# Patient Record
Sex: MALE | Race: WHITE
[De-identification: names, ages, dates, MRNs, and addresses within clinical notes are randomized per-mention and may not be internally consistent; named-entity substitution may affect disease eponyms.]

---

## 2017-03-29 ENCOUNTER — HOSPITAL ENCOUNTER (EMERGENCY)
Dept: HOSPITAL 8 - ED | Age: 57
LOS: 1 days | Discharge: HOME | End: 2017-03-30
Payer: COMMERCIAL

## 2017-03-29 VITALS — HEIGHT: 71 IN | WEIGHT: 253.53 LBS | BODY MASS INDEX: 35.49 KG/M2

## 2017-03-29 DIAGNOSIS — H93.19: ICD-10-CM

## 2017-03-29 DIAGNOSIS — F11.10: ICD-10-CM

## 2017-03-29 DIAGNOSIS — R07.89: ICD-10-CM

## 2017-03-29 DIAGNOSIS — Z79.84: ICD-10-CM

## 2017-03-29 DIAGNOSIS — G89.29: ICD-10-CM

## 2017-03-29 DIAGNOSIS — E11.9: ICD-10-CM

## 2017-03-29 DIAGNOSIS — Z79.4: ICD-10-CM

## 2017-03-29 DIAGNOSIS — R06.02: ICD-10-CM

## 2017-03-29 DIAGNOSIS — I10: ICD-10-CM

## 2017-03-29 DIAGNOSIS — H53.149: ICD-10-CM

## 2017-03-29 DIAGNOSIS — R51: Primary | ICD-10-CM

## 2017-03-29 PROCEDURE — 84484 ASSAY OF TROPONIN QUANT: CPT

## 2017-03-29 PROCEDURE — 93005 ELECTROCARDIOGRAM TRACING: CPT

## 2017-03-29 PROCEDURE — 71010: CPT

## 2017-03-29 PROCEDURE — 70450 CT HEAD/BRAIN W/O DYE: CPT

## 2017-03-29 PROCEDURE — 85025 COMPLETE CBC W/AUTO DIFF WBC: CPT

## 2017-03-29 PROCEDURE — 80053 COMPREHEN METABOLIC PANEL: CPT

## 2017-03-29 PROCEDURE — 36415 COLL VENOUS BLD VENIPUNCTURE: CPT

## 2017-03-30 VITALS — SYSTOLIC BLOOD PRESSURE: 127 MMHG | DIASTOLIC BLOOD PRESSURE: 81 MMHG

## 2017-03-30 LAB
AST SERPL-CCNC: 39 U/L (ref 15–37)
BUN SERPL-MCNC: 9 MG/DL (ref 7–18)
HGB BLD-MCNC: 14 G/DL (ref 13.7–18)
IS PT STATUS REG ER OR PRE ER?: YES

## 2017-08-21 ENCOUNTER — APPOINTMENT (OUTPATIENT)
Dept: RADIOLOGY | Facility: MEDICAL CENTER | Age: 57
End: 2017-08-21
Attending: EMERGENCY MEDICINE
Payer: MEDICARE

## 2017-08-21 ENCOUNTER — HOSPITAL ENCOUNTER (EMERGENCY)
Facility: MEDICAL CENTER | Age: 57
End: 2017-08-21
Attending: EMERGENCY MEDICINE
Payer: MEDICARE

## 2017-08-21 VITALS
SYSTOLIC BLOOD PRESSURE: 187 MMHG | HEART RATE: 58 BPM | WEIGHT: 257 LBS | RESPIRATION RATE: 16 BRPM | HEIGHT: 71 IN | TEMPERATURE: 98.8 F | BODY MASS INDEX: 35.98 KG/M2 | DIASTOLIC BLOOD PRESSURE: 154 MMHG | OXYGEN SATURATION: 91 %

## 2017-08-21 DIAGNOSIS — R07.89 XYPHOIDALGIA: ICD-10-CM

## 2017-08-21 DIAGNOSIS — N50.811 TESTICULAR PAIN, RIGHT: ICD-10-CM

## 2017-08-21 LAB
ALBUMIN SERPL BCP-MCNC: 4 G/DL (ref 3.2–4.9)
ALBUMIN/GLOB SERPL: 1.3 G/DL
ALP SERPL-CCNC: 77 U/L (ref 30–99)
ALT SERPL-CCNC: 57 U/L (ref 2–50)
ANION GAP SERPL CALC-SCNC: 9 MMOL/L (ref 0–11.9)
APPEARANCE UR: CLEAR
AST SERPL-CCNC: 38 U/L (ref 12–45)
BASOPHILS # BLD AUTO: 1.2 % (ref 0–1.8)
BASOPHILS # BLD: 0.09 K/UL (ref 0–0.12)
BILIRUB SERPL-MCNC: 0.6 MG/DL (ref 0.1–1.5)
BILIRUB UR QL STRIP.AUTO: NEGATIVE
BUN SERPL-MCNC: 10 MG/DL (ref 8–22)
CALCIUM SERPL-MCNC: 9.3 MG/DL (ref 8.5–10.5)
CHLORIDE SERPL-SCNC: 102 MMOL/L (ref 96–112)
CO2 SERPL-SCNC: 25 MMOL/L (ref 20–33)
COLOR UR: YELLOW
CREAT SERPL-MCNC: 0.79 MG/DL (ref 0.5–1.4)
CULTURE IF INDICATED INDCX: NO UA CULTURE
EOSINOPHIL # BLD AUTO: 0.35 K/UL (ref 0–0.51)
EOSINOPHIL NFR BLD: 4.5 % (ref 0–6.9)
ERYTHROCYTE [DISTWIDTH] IN BLOOD BY AUTOMATED COUNT: 38.8 FL (ref 35.9–50)
GFR SERPL CREATININE-BSD FRML MDRD: >60 ML/MIN/1.73 M 2
GLOBULIN SER CALC-MCNC: 3.2 G/DL (ref 1.9–3.5)
GLUCOSE SERPL-MCNC: 196 MG/DL (ref 65–99)
GLUCOSE UR STRIP.AUTO-MCNC: >=1000 MG/DL
HCT VFR BLD AUTO: 43.5 % (ref 42–52)
HGB BLD-MCNC: 15.3 G/DL (ref 14–18)
IMM GRANULOCYTES # BLD AUTO: 0.03 K/UL (ref 0–0.11)
IMM GRANULOCYTES NFR BLD AUTO: 0.4 % (ref 0–0.9)
KETONES UR STRIP.AUTO-MCNC: NEGATIVE MG/DL
LEUKOCYTE ESTERASE UR QL STRIP.AUTO: NEGATIVE
LIPASE SERPL-CCNC: 26 U/L (ref 11–82)
LYMPHOCYTES # BLD AUTO: 3.6 K/UL (ref 1–4.8)
LYMPHOCYTES NFR BLD: 46.5 % (ref 22–41)
MCH RBC QN AUTO: 29.8 PG (ref 27–33)
MCHC RBC AUTO-ENTMCNC: 35.2 G/DL (ref 33.7–35.3)
MCV RBC AUTO: 84.6 FL (ref 81.4–97.8)
MICRO URNS: ABNORMAL
MONOCYTES # BLD AUTO: 0.38 K/UL (ref 0–0.85)
MONOCYTES NFR BLD AUTO: 4.9 % (ref 0–13.4)
NEUTROPHILS # BLD AUTO: 3.3 K/UL (ref 1.82–7.42)
NEUTROPHILS NFR BLD: 42.5 % (ref 44–72)
NITRITE UR QL STRIP.AUTO: NEGATIVE
NRBC # BLD AUTO: 0 K/UL
NRBC BLD AUTO-RTO: 0 /100 WBC
PH UR STRIP.AUTO: 5 [PH]
PLATELET # BLD AUTO: 144 K/UL (ref 164–446)
PMV BLD AUTO: 11.4 FL (ref 9–12.9)
POTASSIUM SERPL-SCNC: 3.8 MMOL/L (ref 3.6–5.5)
PROT SERPL-MCNC: 7.2 G/DL (ref 6–8.2)
PROT UR QL STRIP: NEGATIVE MG/DL
RBC # BLD AUTO: 5.14 M/UL (ref 4.7–6.1)
RBC UR QL AUTO: NEGATIVE
SODIUM SERPL-SCNC: 136 MMOL/L (ref 135–145)
SP GR UR REFRACTOMETRY: >1.035
SP GR UR STRIP.AUTO: >=1.045
UROBILINOGEN UR STRIP.AUTO-MCNC: 0.2 MG/DL
WBC # BLD AUTO: 7.8 K/UL (ref 4.8–10.8)

## 2017-08-21 PROCEDURE — 96374 THER/PROPH/DIAG INJ IV PUSH: CPT | Mod: XU

## 2017-08-21 PROCEDURE — 76870 US EXAM SCROTUM: CPT

## 2017-08-21 PROCEDURE — 74177 CT ABD & PELVIS W/CONTRAST: CPT

## 2017-08-21 PROCEDURE — 700111 HCHG RX REV CODE 636 W/ 250 OVERRIDE (IP): Performed by: EMERGENCY MEDICINE

## 2017-08-21 PROCEDURE — 80053 COMPREHEN METABOLIC PANEL: CPT

## 2017-08-21 PROCEDURE — 93005 ELECTROCARDIOGRAM TRACING: CPT | Performed by: EMERGENCY MEDICINE

## 2017-08-21 PROCEDURE — 83690 ASSAY OF LIPASE: CPT

## 2017-08-21 PROCEDURE — 700117 HCHG RX CONTRAST REV CODE 255: Performed by: EMERGENCY MEDICINE

## 2017-08-21 PROCEDURE — 99284 EMERGENCY DEPT VISIT MOD MDM: CPT

## 2017-08-21 PROCEDURE — 85025 COMPLETE CBC W/AUTO DIFF WBC: CPT

## 2017-08-21 PROCEDURE — 81003 URINALYSIS AUTO W/O SCOPE: CPT

## 2017-08-21 RX ORDER — LEVOTHYROXINE SODIUM 0.05 MG/1
75 TABLET ORAL
COMMUNITY
End: 2021-04-06

## 2017-08-21 RX ORDER — METHADONE HYDROCHLORIDE 10 MG/1
150 TABLET ORAL
COMMUNITY
End: 2021-04-06

## 2017-08-21 RX ORDER — ONDANSETRON 2 MG/ML
4 INJECTION INTRAMUSCULAR; INTRAVENOUS ONCE
Status: COMPLETED | OUTPATIENT
Start: 2017-08-21 | End: 2017-08-21

## 2017-08-21 RX ADMIN — ONDANSETRON 4 MG: 2 INJECTION INTRAMUSCULAR; INTRAVENOUS at 14:05

## 2017-08-21 RX ADMIN — IOHEXOL 100 ML: 350 INJECTION, SOLUTION INTRAVENOUS at 14:19

## 2017-08-21 ASSESSMENT — LIFESTYLE VARIABLES: DO YOU DRINK ALCOHOL: NO

## 2017-08-21 NOTE — DISCHARGE INSTRUCTIONS
Chest Wall Pain  Chest wall pain is pain in or around the bones and muscles of your chest. It may take up to 6 weeks to get better. It may take longer if you must stay physically active in your work and activities.   CAUSES   Chest wall pain may happen on its own. However, it may be caused by:  · A viral illness like the flu.  · Injury.  · Coughing.  · Exercise.  · Arthritis.  · Fibromyalgia.  · Shingles.  HOME CARE INSTRUCTIONS   · Avoid overtiring physical activity. Try not to strain or perform activities that cause pain. This includes any activities using your chest or your abdominal and side muscles, especially if heavy weights are used.  · Put ice on the sore area.  ¨ Put ice in a plastic bag.  ¨ Place a towel between your skin and the bag.  ¨ Leave the ice on for 15-20 minutes per hour while awake for the first 2 days.  · Only take over-the-counter or prescription medicines for pain, discomfort, or fever as directed by your caregiver.  SEEK IMMEDIATE MEDICAL CARE IF:   · Your pain increases, or you are very uncomfortable.  · You have a fever.  · Your chest pain becomes worse.  · You have new, unexplained symptoms.  · You have nausea or vomiting.  · You feel sweaty or lightheaded.  · You have a cough with phlegm (sputum), or you cough up blood.  MAKE SURE YOU:   · Understand these instructions.  · Will watch your condition.  · Will get help right away if you are not doing well or get worse.     This information is not intended to replace advice given to you by your health care provider. Make sure you discuss any questions you have with your health care provider.     Document Released: 12/18/2006 Document Revised: 03/11/2013 Document Reviewed: 03/14/2016  [employer] Interactive Patient Education ©2016 [employer]

## 2017-08-21 NOTE — ED PROVIDER NOTES
ED Provider Note    Scribed for Cali Mitchell M.D. by Harjeet Montgomery. 8/21/2017  12:54 PM    Primary care provider: GIL Pang  Means of arrival: Walk-in  History obtained from: Patient  History limited by: None    CHIEF COMPLAINT  Chief Complaint   Patient presents with   • Hernia     Pt had his gallbladder removed a long time ago and told he had a hernia repaired.    • Epigastric Pain     Per pt today having severe epigastric pain, + lump. Sent by his PCP to get evaluated.    • Testicle Pain     off and on for about 6 month. Denies swelling. States some difficulty urinating.       HPI  Jessee Martinez is a 56 y.o. male who presents to the Emergency Department for evaluation of an abdominal mass onset this morning. He has associated pain that also onset this morning. The pain is constant and similar in quality to a bruise or being kicked by a horse. The patient also reports associated nausea and testicular pain intermittently for six months. He has not seen a physician for his testicular pain. He denies any testicular swelling, dysuria, fever, constipation, or bloating. The patient reports that he normal lonely has a bowel movement every couple days. He has been passing gas normally. The patient has had a cholecystectomy in the 2000s as well as a past hernia surgery of unspecified type.    REVIEW OF SYSTEMS  Pertinent positives include abdominal pain, mass, nausea, and testicular pain. Pertinent negatives include no testicular swelling, dysuria, fever, constipation, or bloating.  All other systems reviewed and negative.  C    PAST MEDICAL HISTORY   has a past medical history of Hypertension; Diabetes ([facility]); and Thyroid disorder (2012).    SURGICAL HISTORY  Cholecystectomy.    SOCIAL HISTORY  Social History   Substance Use Topics   • Smoking status: None noted.        • Alcohol Use: None noted.     FAMILY HISTORY  No family history noted.    CURRENT MEDICATIONS  No current  "facility-administered medications on file prior to encounter.     No current outpatient prescriptions on file prior to encounter.       ALLERGIES  No Known Allergies    PHYSICAL EXAM  VITAL SIGNS: /154 mmHg  Pulse 76  Temp(Src) 37.1 °C (98.8 °F)  Resp 16  Ht 1.803 m (5' 10.98\")  Wt 116.574 kg (257 lb)  BMI 35.86 kg/m2  SpO2 95%    Constitutional: Well developed, Well nourished, Mild distress, Non-toxic appearance.   HENT: Normocephalic, Atraumatic, Bilateral external ears normal, Oropharynx moist, No oral exudates.   Eyes: PERRLA, EOMI, Conjunctiva normal, No discharge.   Neck: No tenderness, Supple, No stridor.   Lymphatic: No lymphadenopathy noted.   Cardiovascular: Normal heart rate, Normal rhythm.   Thorax & Lungs: Clear to auscultation bilaterally, No respiratory distress, No wheezing, No crackles. Tenderness of the xyphoid without any appreciable soft tissue swelling.  Abdomen: Soft, Mild diffuse abdominal tenderness throughout., No masses, No pulsatile masses.   : Tenderness to palpation throughout right testicle.  Skin: Warm, Dry, No erythema, No rash.   Extremities:, No edema No cyanosis.   Musculoskeletal: No major deformities noted.  Intact distal pulses  Neurologic: Awake, alert. Moves all extremities spontaneously.  Psychiatric: Affect normal, Judgment normal, Mood normal.       LABS  Results for orders placed or performed during the hospital encounter of 08/21/17   CBC WITH DIFFERENTIAL   Result Value Ref Range    WBC 7.8 4.8 - 10.8 K/uL    RBC 5.14 4.70 - 6.10 M/uL    Hemoglobin 15.3 14.0 - 18.0 g/dL    Hematocrit 43.5 42.0 - 52.0 %    MCV 84.6 81.4 - 97.8 fL    MCH 29.8 27.0 - 33.0 pg    MCHC 35.2 33.7 - 35.3 g/dL    RDW 38.8 35.9 - 50.0 fL    Platelet Count 144 (L) 164 - 446 K/uL    MPV 11.4 9.0 - 12.9 fL    Neutrophils-Polys 42.50 (L) 44.00 - 72.00 %    Lymphocytes 46.50 (H) 22.00 - 41.00 %    Monocytes 4.90 0.00 - 13.40 %    Eosinophils 4.50 0.00 - 6.90 %    Basophils 1.20 0.00 - " 1.80 %    Immature Granulocytes 0.40 0.00 - 0.90 %    Nucleated RBC 0.00 /100 WBC    Neutrophils (Absolute) 3.30 1.82 - 7.42 K/uL    Lymphs (Absolute) 3.60 1.00 - 4.80 K/uL    Monos (Absolute) 0.38 0.00 - 0.85 K/uL    Eos (Absolute) 0.35 0.00 - 0.51 K/uL    Baso (Absolute) 0.09 0.00 - 0.12 K/uL    Immature Granulocytes (abs) 0.03 0.00 - 0.11 K/uL    NRBC (Absolute) 0.00 K/uL   COMP METABOLIC PANEL   Result Value Ref Range    Sodium 136 135 - 145 mmol/L    Potassium 3.8 3.6 - 5.5 mmol/L    Chloride 102 96 - 112 mmol/L    Co2 25 20 - 33 mmol/L    Anion Gap 9.0 0.0 - 11.9    Glucose 196 (H) 65 - 99 mg/dL    Bun 10 8 - 22 mg/dL    Creatinine 0.79 0.50 - 1.40 mg/dL    Calcium 9.3 8.5 - 10.5 mg/dL    AST(SGOT) 38 12 - 45 U/L    ALT(SGPT) 57 (H) 2 - 50 U/L    Alkaline Phosphatase 77 30 - 99 U/L    Total Bilirubin 0.6 0.1 - 1.5 mg/dL    Albumin 4.0 3.2 - 4.9 g/dL    Total Protein 7.2 6.0 - 8.2 g/dL    Globulin 3.2 1.9 - 3.5 g/dL    A-G Ratio 1.3 g/dL   LIPASE   Result Value Ref Range    Lipase 26 11 - 82 U/L   ESTIMATED GFR   Result Value Ref Range    GFR If African American >60 >60 mL/min/1.73 m 2    GFR If Non African American >60 >60 mL/min/1.73 m 2   URINALYSIS,CULTURE IF INDICATED   Result Value Ref Range    Micro Urine Req see below     Color Yellow     Character Clear     Specific Gravity >=1.045 (A) <1.035    Ph 5.0 5.0-8.0    Glucose >=1000 (A) Negative mg/dL    Ketones Negative Negative mg/dL    Protein Negative Negative mg/dL    Bilirubin Negative Negative    Urobilinogen, Urine 0.2 Negative    Nitrite Negative Negative    Leukocyte Esterase Negative Negative    Occult Blood Negative Negative    Culture Indicated No UA Culture   REFRACTOMETER SG   Result Value Ref Range    Specific Gravity >1.035 (A)    EKG (NOW)   Result Value Ref Range    Report       [facility] Emergency Dept.    Test Date:  2017-08-21  Pt Name:    BRYSON RAM               Department: ER  MRN:        [id number]                       Room:       [facility]  Gender:     M                            Technician: [id number]  :        1960                   Requested By:RM CEE  Order #:    [id number]                    Reading MD:    Measurements  Intervals                                Axis  Rate:       56                           P:          57  ME:         205                          QRS:        -1  QRSD:       102                          T:          10  QT:         452  QTc:        437    Interpretive Statements  VENTRICULAR-PACED COMPLEXES  NO FURTHER RHYTHM ANALYSIS ATTEMPTED DUE TO PACED RHYTHM  BORDERLINE AV CONDUCTION DELAY  LOW VOLTAGE IN FRONTAL LEADS  No previous ECG available for comparison       All labs reviewed by me.    RADIOLOGY  [id number]   Final Result   .   1.  No intratesticular mass or evidence for torsion.      2.  Tiny left epididymal head cyst.      3.  Small right hydrocele.      CT-ABDOMEN-PELVIS WITH   Final Result      1.  No evidence of bowel obstruction. The appendix is not delineated. No free fluid.   2.  No hydronephrosis.   3.  Hypodensities within the liver consistent with cysts or hemangiomas hypodensities within the kidneys consistent with cysts. They are incompletely characterized.   4.  Hepatic steatosis and mild splenomegaly. Patent portal venous system.   5.  11 mm right ovary gland nodule nonspecific.   6.  Mild upper abdomen adenopathy.   7.  Coronary artery calcifications.        The radiologist's interpretation of all radiological studies have been reviewed by me.    COURSE & MEDICAL DECISION MAKING  Pertinent Labs & Imaging studies reviewed. (See chart for details)        12:54 PM - Patient seen and examined at bedside. Ordered CBC with differential, CMP, EKG, and lipase to evaluate his symptoms. The differential diagnoses include but are not limited to: xyphoid pain vs hernia vs constipation vs testicular mass    1:44 PM I ordered Zofran injection 4 mg and Dilaudid  "injection 1 mg to treat. I also ordered CT abdomen pelvis with contrast, EKG, US scrotum contents, and U/A culture if indicated to evaluate.    3:54 PM I ordered refractometer SG to evaluate.    4:30 PM Review of laboratory results reveal unremarkable results.    4:50 PM - Re-examined; The patient is resting in bed comfortably. I discussed his above findings were overall unremarkable and plans for discharge with a referral to [facility], Dr. Cadena, Urology, and instructed to return to the ED if his symptoms worsen. Patient understands and agrees. Her vitals prior to discharge are: /154 mmHg  Pulse 58  Temp(Src) 37.1 °C (98.8 °F)  Resp 16  Ht 1.803 m (5' 10.98\")  Wt 116.574 kg (257 lb)  BMI 35.86 kg/m2  SpO2 91%    Decision Making:  Patient with xiphoid type swelling and pain, CT scan was unremarkable, the patient does have some mild nonspecific diffuse abdominal pain, the patient also has right-sided testicular pain. Workup here was unremarkable, we'll discharge the patient home, have the patient follow-up with urology, have the patient return in the next 1-2 days if not improved, return sooner with worsening symptoms.    The patient will return for new or worsening symptoms and is stable at the time of discharge.    DISPOSITION:  Patient will be discharged home in stable condition.    FOLLOW UP:  [facility], Emergency Dept  [address] [address] [phone]    If symptoms worsen    Letha Dixon, P.A.  [address] [facility] [address]  [phone]          Mio Cadena M.D.  [address]  [facility] [id number] [phone]          FINAL IMPRESSION  1. Xyphoidalgia    2. Testicular pain, right          Harjeet MAGALLANES (Scribe), am scribing for, and in the presence of, Cali Mitchell M.D..    Electronically signed by: Harjeet Montgomery (Scribe), 8/21/2017    Cali MAGALLANES M.D. personally performed the services " described in this documentation, as scribed by Harjeet Montgomery in my presence, and it is both accurate and complete.    The note accurately reflects work and decisions made by me.  Cali Mitchell  8/21/2017  6:30 PM

## 2017-08-21 NOTE — ED NOTES
Pt c/o abdominal pain 4/10 along with 4/10 pain in testicles.  Pt states Testicle pain has been ongoing for about 6 months.  Abdominal pain began today.  Pt states BS of 198 this morning, regularly around 200 over last several months.  Pt seen this morning at [facility] and advised to see ER for abdominal pain. Pt updated to POC.  Family at bedside

## 2017-08-21 NOTE — ED AVS SNAPSHOT
[employer] Access Code: [email or web address]  Expires: 9/20/2017  5:00 PM    [employer]  A secure, online tool to manage your health information     [employer]’s [employer]® is a secure, online tool that connects you to your personalized health information from the privacy of your home -- day or night - making it very easy for you to manage your healthcare. Once the activation process is completed, you can even access your medical information using the [employer] meir, which is available for free in the Apple Meir store or Google Play store.     [employer] provides the following levels of access (as shown below):   My Chart Features   [facility] Primary Care Doctor [facility]  Specialists [facility]  Urgent  Care Non-[facility]  Primary Care  Doctor   Email your healthcare team securely and privately 24/7 X X X X   Manage appointments: schedule your next appointment; view details of past/upcoming appointments X      Request prescription refills. X      View recent personal medical records, including lab and immunizations X X X X   View health record, including health history, allergies, medications X X X X   Read reports about your outpatient visits, procedures, consult and ER notes X X X X   See your discharge summary, which is a recap of your hospital and/or ER visit that includes your diagnosis, lab results, and care plan. X X       How to register for [employer]:  1. Go to  [email or web address].  2. Click on the Sign Up Now box, which takes you to the New Member Sign Up page. You will need to provide the following information:  a. Enter your [employer] Access Code exactly as it appears at the top of this page. (You will not need to use this code after you’ve completed the sign-up process. If you do not sign up before the expiration date, you must request a new code.)   b. Enter your date of birth.   c. Enter your home email address.   d. Click Submit, and follow the next screen’s instructions.  3. Create a [employer] ID. This will be your [employer]  login ID and cannot be changed, so think of one that is secure and easy to remember.  4. Create a [employer] password. You can change your password at any time.  5. Enter your Password Reset Question and Answer. This can be used at a later time if you forget your password.   6. Enter your e-mail address. This allows you to receive e-mail notifications when new information is available in [employer].  7. Click Sign Up. You can now view your health information.    For assistance activating your [employer] account, call [phone]

## 2017-08-21 NOTE — ED AVS SNAPSHOT
8/21/2017    Jessee Martinez  [address] [place] [address]    Dear Jessee:    [facility] wants to ensure your discharge home is safe and you or your loved ones have had all of your questions answered regarding your care after you leave the hospital.    Below is a list of resources and contact information should you have any questions regarding your hospital stay, follow-up instructions, or active medical symptoms.    Questions or Concerns Regarding… Contact   Medical Questions Related to Your Discharge  (7 days a week, 8am-5pm) Contact a Nurse Care Coordinator   [phone]   Medical Questions Not Related to Your Discharge  (24 hours a day / 7 days a week)  Contact the Nurse Health Line   [phone]    Medications or Discharge Instructions Refer to your discharge packet   or contact your [facility] Primary Care Provider   [phone]   Follow-up Appointment(s) Schedule your appointment via [employer] [facility]   or contact Scheduling [phone]   Billing Review your statement via [employer] [facility]  or contact Billing [phone]   Medical Records Review your records via [employer] [facility]   or contact Medical Records [phone]     You may receive a telephone call within two days of discharge. This call is to make certain you understand your discharge instructions and have the opportunity to have any questions answered. You can also easily access your medical information, test results and upcoming appointments via the [employer] [facility] [employer] [facility] free online health management tool. You can learn more and sign up at [employer] [facility] For assistance setting up your [employer] [facility] account, please call [phone].    Once again, we want to ensure your discharge home is safe and that you have a clear understanding of any next steps in your care. If you have any questions or concerns, please do not hesitate to contact us, we are here for you. Thank you for choosing [facility] for your healthcare needs.    Sincerely,    Your [facility] Healthcare Team

## 2017-08-21 NOTE — ED AVS SNAPSHOT
Home Care Instructions                                                                                                                Jessee Martinez   MRN: [id number]    Department:  [facility], Emergency Dept   Date of Visit:  8/21/2017            [facility], Emergency Dept    [address] [address]    Phone:  [phone]      You were seen by     Cali Mitchell M.D.      Your Diagnosis Was     Xyphoidalgia     R07.89       These are the medications you received during your hospitalization from 08/21/2017 1013 to 08/21/2017 1700     Date/Time Order Dose Route Action    08/21/2017 1405 ondansetron (ZOFRAN) syringe/vial injection 4 mg 4 mg Intravenous Given    08/21/2017 1419 iohexol (OMNIPAQUE) 350 mg/mL 100 mL Intravenous Given      Follow-up Information     1. Follow up with [facility], Emergency Dept.    Specialty:  Emergency Medicine    Why:  If symptoms worsen    Contact information    [address] [facility] [address] [phone]        2. Follow up with GIL Pang.    Specialty:  Family Medicine    Contact information    [address]  [facility] [address] [phone]          3. Follow up with Mio Cadena M.D..    Specialty:  Urology    Contact information    [address]  [facility] [address] [phone]        Medication Information     Review all of your home medications and newly ordered medications with your primary doctor and/or pharmacist as soon as possible. Follow medication instructions as directed by your doctor and/or pharmacist.     Please keep your complete medication list with you and share with your physician. Update the information when medications are discontinued, doses are changed, or new medications (including over-the-counter products) are added; and carry medication information at all times in the event of emergency situations.               Medication List      ASK your  doctor about these medications        Instructions    Morning Afternoon Evening Bedtime    levothyroxine 50 MCG Tabs   Commonly known as:  SYNTHROID        Take 50 mcg by mouth Every morning on an empty stomach.   Dose:  50 mcg                        metformin 500 MG Tabs   Commonly known as:  GLUCOPHAGE        Take 500 mg by mouth 2 times a day, with meals.   Dose:  500 mg                        methadone 10 MG Tabs   Commonly known as:  DOLOPHINE        Take 150 mg by mouth.   Dose:  150 mg                                Procedures and tests performed during your visit     Procedure/Test Number of Times Performed    CBC WITH DIFFERENTIAL 1    COMP METABOLIC PANEL 1    CONSENT FOR CONTRAST INJECTION 4    CT-ABDOMEN-PELVIS WITH 1    EKG (NOW) 1    ESTIMATED GFR 1    IV Saline Lock 1    LIPASE 1    Pulse Ox 1    REFRACTOMETER SG 1    URINALYSIS,CULTURE IF INDICATED 1    [id number] 1        Discharge Instructions       Chest Wall Pain  Chest wall pain is pain in or around the bones and muscles of your chest. It may take up to 6 weeks to get better. It may take longer if you must stay physically active in your work and activities.   CAUSES   Chest wall pain may happen on its own. However, it may be caused by:  · A viral illness like the flu.  · Injury.  · Coughing.  · Exercise.  · Arthritis.  · Fibromyalgia.  · Shingles.  HOME CARE INSTRUCTIONS   · Avoid overtiring physical activity. Try not to strain or perform activities that cause pain. This includes any activities using your chest or your abdominal and side muscles, especially if heavy weights are used.  · Put ice on the sore area.  ¨ Put ice in a plastic bag.  ¨ Place a towel between your skin and the bag.  ¨ Leave the ice on for 15-20 minutes per hour while awake for the first 2 days.  · Only take over-the-counter or prescription medicines for pain, discomfort, or fever as directed by your caregiver.  SEEK IMMEDIATE MEDICAL CARE IF:   · Your pain  increases, or you are very uncomfortable.  · You have a fever.  · Your chest pain becomes worse.  · You have new, unexplained symptoms.  · You have nausea or vomiting.  · You feel sweaty or lightheaded.  · You have a cough with phlegm (sputum), or you cough up blood.  MAKE SURE YOU:   · Understand these instructions.  · Will watch your condition.  · Will get help right away if you are not doing well or get worse.     This information is not intended to replace advice given to you by your health care provider. Make sure you discuss any questions you have with your health care provider.     Document Released: 12/18/2006 Document Revised: 03/11/2013 Document Reviewed: 03/14/2016  [employer] Interactive Patient Education ©2016 [employer]            Patient Information     Patient Information    Following emergency treatment: all patient requiring follow-up care must return either to a private physician or a clinic if your condition worsens before you are able to obtain further medical attention, please return to the emergency room.     Billing Information    At [facility], we work to make the billing process streamlined for our patients.  Our Representatives are here to answer any questions you may have regarding your hospital bill.  If you have insurance coverage and have supplied your insurance information to us, we will submit a claim to your insurer on your behalf.  Should you have any questions regarding your bill, we can be reached online or by phone as follows:  Online: You are able pay your bills online or live chat with our representatives about any billing questions you may have. We are here to help Monday - Friday from 8:00am to 7:30pm and 9:00am - 12:00pm on Saturdays.  Please visit [email or web address]  for more information.   Phone:  [phone] or [phone]    Please note that your emergency physician, surgeon, pathologist, radiologist, anesthesiologist, and  other specialists are not employed by [facility] and will therefore bill separately for their services.  Please contact them directly for any questions concerning their bills at the numbers below:     Emergency Physician Services:  [phone]  [facility]:  [phone]  [facility]:  [phone]  [facility]:  [phone]    1. Your final bill may vary from the amount quoted upon discharge if all procedures are not complete at that time, or if your doctor has additional procedures of which we are not aware. You will receive an additional bill if you return to the Emergency Department at [facility] access hospital for suture removal regardless of the facility of which the sutures were placed.     2. Please arrange for settlement of this account at the emergency registration.    3. All self-pay accounts are due in full at the time of treatment.  If you are unable to meet this obligation then payment is expected within 4-5 days.     4. If you have had radiology studies (CT, X-ray, Ultrasound, MRI), you have received a preliminary result during your emergency department visit. Please contact the radiology department [phone] to receive a copy of your final result. Please discuss the Final result with your primary physician or with the follow up physician provided.     Crisis Hotline:  [place] Crisis Hotline:  0-678-EOVHOPX or 1-929.413.1777  [place] Crisis Hotline:    1-767.604.9571 or 866-483-7664         ED Discharge Follow Up Questions    1. In order to provide you with very good care, we would like to follow up with a phone call in the next few days.  May we have your permission to contact you?     YES /  NO    2. What is the best phone number to call you? (       )_____-__________    3. What is the best time to call you?      Morning  /  Afternoon  /  Evening                   Patient Signature:  ____________________________________________________________    Date:   ____________________________________________________________

## 2017-08-24 LAB — EKG IMPRESSION: NORMAL

## 2017-09-25 ENCOUNTER — APPOINTMENT (OUTPATIENT)
Dept: SOCIAL WORK | Facility: CLINIC | Age: 57
End: 2017-09-25
Payer: MEDICARE

## 2020-08-12 ENCOUNTER — APPOINTMENT (RX ONLY)
Dept: URBAN - METROPOLITAN AREA CLINIC 4 | Facility: CLINIC | Age: 60
Setting detail: DERMATOLOGY
End: 2020-08-12

## 2020-08-12 DIAGNOSIS — L21.8 OTHER SEBORRHEIC DERMATITIS: ICD-10-CM

## 2020-08-12 DIAGNOSIS — L57.0 ACTINIC KERATOSIS: ICD-10-CM

## 2020-08-12 PROCEDURE — ? LIQUID NITROGEN

## 2020-08-12 PROCEDURE — 17004 DESTROY PREMAL LESIONS 15/>: CPT

## 2020-08-12 PROCEDURE — ? PRESCRIPTION

## 2020-08-12 PROCEDURE — 99202 OFFICE O/P NEW SF 15 MIN: CPT | Mod: 25

## 2020-08-12 PROCEDURE — ? COUNSELING

## 2020-08-12 RX ORDER — CICLOPIROX OLAMINE 7.7 MG/G
1 CREAM TOPICAL BID
Qty: 1 | Refills: 3 | Status: ERX | COMMUNITY
Start: 2020-08-12

## 2020-08-12 RX ORDER — HYDROCORTISONE 25 MG/G
1 CREAM TOPICAL QD
Qty: 1 | Refills: 3 | Status: ERX | COMMUNITY
Start: 2020-08-12

## 2020-08-12 RX ADMIN — HYDROCORTISONE 1: 25 CREAM TOPICAL at 00:00

## 2020-08-12 RX ADMIN — CICLOPIROX OLAMINE 1: 7.7 CREAM TOPICAL at 00:00

## 2020-08-12 ASSESSMENT — LOCATION DETAILED DESCRIPTION DERM
LOCATION DETAILED: INFERIOR MID FOREHEAD
LOCATION DETAILED: RIGHT INFERIOR CENTRAL MALAR CHEEK
LOCATION DETAILED: RIGHT CENTRAL TEMPLE
LOCATION DETAILED: RIGHT DISTAL ULNAR DORSAL FOREARM
LOCATION DETAILED: RIGHT DORSAL WRIST
LOCATION DETAILED: RIGHT PROXIMAL DORSAL FOREARM
LOCATION DETAILED: LEFT DISTAL DORSAL FOREARM
LOCATION DETAILED: RIGHT CENTRAL MALAR CHEEK
LOCATION DETAILED: RIGHT ELBOW
LOCATION DETAILED: LEFT CLAVICULAR NECK
LOCATION DETAILED: LEFT PROXIMAL DORSAL FOREARM
LOCATION DETAILED: RIGHT RADIAL DORSAL HAND
LOCATION DETAILED: LEFT DORSAL WRIST
LOCATION DETAILED: LEFT MEDIAL ZYGOMA
LOCATION DETAILED: LEFT INFERIOR LATERAL FOREHEAD
LOCATION DETAILED: RIGHT DISTAL DORSAL FOREARM
LOCATION DETAILED: LEFT CENTRAL MALAR CHEEK
LOCATION DETAILED: LEFT PROXIMAL ULNAR DORSAL FOREARM
LOCATION DETAILED: LEFT DISTAL POSTERIOR UPPER ARM
LOCATION DETAILED: LEFT LATERAL MALAR CHEEK
LOCATION DETAILED: LEFT ULNAR DORSAL HAND

## 2020-08-12 ASSESSMENT — LOCATION SIMPLE DESCRIPTION DERM
LOCATION SIMPLE: LEFT WRIST
LOCATION SIMPLE: LEFT UPPER ARM
LOCATION SIMPLE: LEFT ANTERIOR NECK
LOCATION SIMPLE: LEFT HAND
LOCATION SIMPLE: RIGHT CHEEK
LOCATION SIMPLE: LEFT ZYGOMA
LOCATION SIMPLE: INFERIOR FOREHEAD
LOCATION SIMPLE: RIGHT TEMPLE
LOCATION SIMPLE: LEFT CHEEK
LOCATION SIMPLE: LEFT FOREARM
LOCATION SIMPLE: RIGHT HAND
LOCATION SIMPLE: RIGHT ELBOW
LOCATION SIMPLE: LEFT FOREHEAD
LOCATION SIMPLE: RIGHT WRIST
LOCATION SIMPLE: RIGHT FOREARM

## 2020-08-12 ASSESSMENT — LOCATION ZONE DERM
LOCATION ZONE: HAND
LOCATION ZONE: FACE
LOCATION ZONE: NECK
LOCATION ZONE: ARM

## 2020-12-03 ENCOUNTER — APPOINTMENT (RX ONLY)
Dept: URBAN - METROPOLITAN AREA CLINIC 4 | Facility: CLINIC | Age: 60
Setting detail: DERMATOLOGY
End: 2020-12-03

## 2020-12-03 DIAGNOSIS — L57.0 ACTINIC KERATOSIS: ICD-10-CM

## 2020-12-03 DIAGNOSIS — L21.8 OTHER SEBORRHEIC DERMATITIS: ICD-10-CM | Status: RESOLVING

## 2020-12-03 DIAGNOSIS — L82.0 INFLAMED SEBORRHEIC KERATOSIS: ICD-10-CM

## 2020-12-03 PROBLEM — D23.39 OTHER BENIGN NEOPLASM OF SKIN OF OTHER PARTS OF FACE: Status: ACTIVE | Noted: 2020-12-03

## 2020-12-03 PROCEDURE — 17110 DESTRUCTION B9 LES UP TO 14: CPT

## 2020-12-03 PROCEDURE — ? LIQUID NITROGEN

## 2020-12-03 PROCEDURE — ? COUNSELING

## 2020-12-03 PROCEDURE — 99213 OFFICE O/P EST LOW 20 MIN: CPT | Mod: 25

## 2020-12-03 PROCEDURE — 17003 DESTRUCT PREMALG LES 2-14: CPT | Mod: 59

## 2020-12-03 PROCEDURE — 17000 DESTRUCT PREMALG LESION: CPT | Mod: 59

## 2020-12-03 ASSESSMENT — LOCATION DETAILED DESCRIPTION DERM
LOCATION DETAILED: LEFT POSTERIOR AXILLA
LOCATION DETAILED: LEFT LATERAL INFERIOR CHEST
LOCATION DETAILED: LEFT DISTAL DORSAL FOREARM
LOCATION DETAILED: RIGHT FOREHEAD
LOCATION DETAILED: RIGHT CENTRAL TEMPLE
LOCATION DETAILED: RIGHT PROXIMAL DORSAL FOREARM
LOCATION DETAILED: LEFT AXILLARY VAULT
LOCATION DETAILED: RIGHT DISTAL DORSAL FOREARM
LOCATION DETAILED: POSTERIOR MID-PARIETAL SCALP
LOCATION DETAILED: LEFT DISTAL RADIAL DORSAL FOREARM
LOCATION DETAILED: LEFT PROXIMAL DORSAL FOREARM
LOCATION DETAILED: LEFT ELBOW
LOCATION DETAILED: LEFT RIB CAGE
LOCATION DETAILED: INFERIOR MID FOREHEAD

## 2020-12-03 ASSESSMENT — LOCATION SIMPLE DESCRIPTION DERM
LOCATION SIMPLE: INFERIOR FOREHEAD
LOCATION SIMPLE: CHEST
LOCATION SIMPLE: RIGHT FOREHEAD
LOCATION SIMPLE: LEFT FOREARM
LOCATION SIMPLE: ABDOMEN
LOCATION SIMPLE: POSTERIOR SCALP
LOCATION SIMPLE: LEFT POSTERIOR AXILLA
LOCATION SIMPLE: LEFT AXILLARY VAULT
LOCATION SIMPLE: LEFT ELBOW
LOCATION SIMPLE: RIGHT TEMPLE
LOCATION SIMPLE: RIGHT FOREARM

## 2020-12-03 ASSESSMENT — LOCATION ZONE DERM
LOCATION ZONE: FACE
LOCATION ZONE: AXILLAE
LOCATION ZONE: TRUNK
LOCATION ZONE: SCALP
LOCATION ZONE: ARM

## 2020-12-03 NOTE — PROCEDURE: LIQUID NITROGEN
Render Note In Bullet Format When Appropriate: No
Number Of Freeze-Thaw Cycles: 2 freeze-thaw cycles
Post-Care Instructions: I reviewed with the patient in detail post-care instructions. Patient is to wear sunprotection, and avoid picking at any of the treated lesions. Pt may apply Vaseline to crusted or scabbing areas.
Detail Level: Detailed
Consent: The patient's consent was obtained including but not limited to risks of crusting, scabbing, blistering, scarring, darker or lighter pigmentary change, recurrence, incomplete removal and infection.
Duration Of Freeze Thaw-Cycle (Seconds): 5
Medical Necessity Clause: This procedure was medically necessary because the lesions that were treated were:
Medical Necessity Information: It is in your best interest to select a reason for this procedure from the list below. All of these items fulfill various CMS LCD requirements except the new and changing color options.

## 2021-04-06 ENCOUNTER — APPOINTMENT (OUTPATIENT)
Dept: RADIOLOGY | Facility: MEDICAL CENTER | Age: 61
DRG: 247 | End: 2021-04-06
Attending: EMERGENCY MEDICINE
Payer: MEDICARE

## 2021-04-06 ENCOUNTER — HOSPITAL ENCOUNTER (INPATIENT)
Facility: MEDICAL CENTER | Age: 61
LOS: 3 days | DRG: 247 | End: 2021-04-09
Attending: EMERGENCY MEDICINE | Admitting: STUDENT IN AN ORGANIZED HEALTH CARE EDUCATION/TRAINING PROGRAM
Payer: MEDICARE

## 2021-04-06 DIAGNOSIS — T82.868A THROMBOSIS OF ARTERIAL STENT, INITIAL ENCOUNTER (HCC): ICD-10-CM

## 2021-04-06 DIAGNOSIS — R07.9 ACUTE CHEST PAIN: ICD-10-CM

## 2021-04-06 DIAGNOSIS — R06.02 SHORTNESS OF BREATH: ICD-10-CM

## 2021-04-06 DIAGNOSIS — I21.4 NSTEMI (NON-ST ELEVATED MYOCARDIAL INFARCTION) (HCC): ICD-10-CM

## 2021-04-06 PROBLEM — I10 ESSENTIAL HYPERTENSION: Status: ACTIVE | Noted: 2021-04-06

## 2021-04-06 PROBLEM — E11.9 TYPE 2 DIABETES MELLITUS WITHOUT COMPLICATION, WITH LONG-TERM CURRENT USE OF INSULIN (HCC): Status: ACTIVE | Noted: 2021-04-06

## 2021-04-06 PROBLEM — E03.9 ACQUIRED HYPOTHYROIDISM: Status: ACTIVE | Noted: 2021-04-06

## 2021-04-06 PROBLEM — Z72.0 TOBACCO ABUSE: Status: ACTIVE | Noted: 2021-04-06

## 2021-04-06 PROBLEM — Z79.4 TYPE 2 DIABETES MELLITUS WITHOUT COMPLICATION, WITH LONG-TERM CURRENT USE OF INSULIN (HCC): Status: ACTIVE | Noted: 2021-04-06

## 2021-04-06 LAB
ALBUMIN SERPL BCP-MCNC: 4.1 G/DL (ref 3.2–4.9)
ALBUMIN/GLOB SERPL: 1.4 G/DL
ALP SERPL-CCNC: 74 U/L (ref 30–99)
ALT SERPL-CCNC: 45 U/L (ref 2–50)
ANION GAP SERPL CALC-SCNC: 8 MMOL/L (ref 7–16)
APTT PPP: 34.4 SEC (ref 24.7–36)
AST SERPL-CCNC: 40 U/L (ref 12–45)
BASOPHILS # BLD AUTO: 1.1 % (ref 0–1.8)
BASOPHILS # BLD: 0.07 K/UL (ref 0–0.12)
BILIRUB SERPL-MCNC: 0.3 MG/DL (ref 0.1–1.5)
BUN SERPL-MCNC: 14 MG/DL (ref 8–22)
CALCIUM SERPL-MCNC: 8.9 MG/DL (ref 8.5–10.5)
CHLORIDE SERPL-SCNC: 103 MMOL/L (ref 96–112)
CO2 SERPL-SCNC: 25 MMOL/L (ref 20–33)
CREAT SERPL-MCNC: 0.75 MG/DL (ref 0.5–1.4)
EKG IMPRESSION: NORMAL
EKG IMPRESSION: NORMAL
EOSINOPHIL # BLD AUTO: 0.4 K/UL (ref 0–0.51)
EOSINOPHIL NFR BLD: 6.4 % (ref 0–6.9)
ERYTHROCYTE [DISTWIDTH] IN BLOOD BY AUTOMATED COUNT: 41.3 FL (ref 35.9–50)
GLOBULIN SER CALC-MCNC: 2.9 G/DL (ref 1.9–3.5)
GLUCOSE BLD-MCNC: 118 MG/DL (ref 65–99)
GLUCOSE BLD-MCNC: 62 MG/DL (ref 65–99)
GLUCOSE SERPL-MCNC: 175 MG/DL (ref 65–99)
HCT VFR BLD AUTO: 45.1 % (ref 42–52)
HGB BLD-MCNC: 15.4 G/DL (ref 14–18)
IMM GRANULOCYTES # BLD AUTO: 0.01 K/UL (ref 0–0.11)
IMM GRANULOCYTES NFR BLD AUTO: 0.2 % (ref 0–0.9)
INR PPP: 0.96 (ref 0.87–1.13)
LYMPHOCYTES # BLD AUTO: 2.3 K/UL (ref 1–4.8)
LYMPHOCYTES NFR BLD: 37 % (ref 22–41)
MCH RBC QN AUTO: 29.4 PG (ref 27–33)
MCHC RBC AUTO-ENTMCNC: 34.1 G/DL (ref 33.7–35.3)
MCV RBC AUTO: 86.2 FL (ref 81.4–97.8)
MONOCYTES # BLD AUTO: 0.39 K/UL (ref 0–0.85)
MONOCYTES NFR BLD AUTO: 6.3 % (ref 0–13.4)
NEUTROPHILS # BLD AUTO: 3.04 K/UL (ref 1.82–7.42)
NEUTROPHILS NFR BLD: 49 % (ref 44–72)
NRBC # BLD AUTO: 0 K/UL
NRBC BLD-RTO: 0 /100 WBC
PLATELET # BLD AUTO: 157 K/UL (ref 164–446)
PMV BLD AUTO: 11.2 FL (ref 9–12.9)
POTASSIUM SERPL-SCNC: 4.4 MMOL/L (ref 3.6–5.5)
PROT SERPL-MCNC: 7 G/DL (ref 6–8.2)
PROTHROMBIN TIME: 13.1 SEC (ref 12–14.6)
RBC # BLD AUTO: 5.23 M/UL (ref 4.7–6.1)
SARS-COV-2 RNA RESP QL NAA+PROBE: NOTDETECTED
SODIUM SERPL-SCNC: 136 MMOL/L (ref 135–145)
SPECIMEN SOURCE: NORMAL
TROPONIN T SERPL-MCNC: 81 NG/L (ref 6–19)
UFH PPP CHRO-ACNC: 0.46 IU/ML
UFH PPP CHRO-ACNC: <0.1 IU/ML
WBC # BLD AUTO: 6.2 K/UL (ref 4.8–10.8)

## 2021-04-06 PROCEDURE — 80053 COMPREHEN METABOLIC PANEL: CPT

## 2021-04-06 PROCEDURE — 700111 HCHG RX REV CODE 636 W/ 250 OVERRIDE (IP): Performed by: STUDENT IN AN ORGANIZED HEALTH CARE EDUCATION/TRAINING PROGRAM

## 2021-04-06 PROCEDURE — 85610 PROTHROMBIN TIME: CPT

## 2021-04-06 PROCEDURE — 82962 GLUCOSE BLOOD TEST: CPT | Mod: 91

## 2021-04-06 PROCEDURE — 36415 COLL VENOUS BLD VENIPUNCTURE: CPT

## 2021-04-06 PROCEDURE — 93005 ELECTROCARDIOGRAM TRACING: CPT | Performed by: STUDENT IN AN ORGANIZED HEALTH CARE EDUCATION/TRAINING PROGRAM

## 2021-04-06 PROCEDURE — U0005 INFEC AGEN DETEC AMPLI PROBE: HCPCS

## 2021-04-06 PROCEDURE — 99221 1ST HOSP IP/OBS SF/LOW 40: CPT | Mod: AI | Performed by: STUDENT IN AN ORGANIZED HEALTH CARE EDUCATION/TRAINING PROGRAM

## 2021-04-06 PROCEDURE — 94760 N-INVAS EAR/PLS OXIMETRY 1: CPT

## 2021-04-06 PROCEDURE — 700117 HCHG RX CONTRAST REV CODE 255: Performed by: EMERGENCY MEDICINE

## 2021-04-06 PROCEDURE — 770020 HCHG ROOM/CARE - TELE (206)

## 2021-04-06 PROCEDURE — A9270 NON-COVERED ITEM OR SERVICE: HCPCS | Performed by: STUDENT IN AN ORGANIZED HEALTH CARE EDUCATION/TRAINING PROGRAM

## 2021-04-06 PROCEDURE — 93010 ELECTROCARDIOGRAM REPORT: CPT | Performed by: INTERNAL MEDICINE

## 2021-04-06 PROCEDURE — 93005 ELECTROCARDIOGRAM TRACING: CPT

## 2021-04-06 PROCEDURE — 71275 CT ANGIOGRAPHY CHEST: CPT | Mod: MG

## 2021-04-06 PROCEDURE — 85730 THROMBOPLASTIN TIME PARTIAL: CPT

## 2021-04-06 PROCEDURE — 99285 EMERGENCY DEPT VISIT HI MDM: CPT

## 2021-04-06 PROCEDURE — U0003 INFECTIOUS AGENT DETECTION BY NUCLEIC ACID (DNA OR RNA); SEVERE ACUTE RESPIRATORY SYNDROME CORONAVIRUS 2 (SARS-COV-2) (CORONAVIRUS DISEASE [COVID-19]), AMPLIFIED PROBE TECHNIQUE, MAKING USE OF HIGH THROUGHPUT TECHNOLOGIES AS DESCRIBED BY CMS-2020-01-R: HCPCS

## 2021-04-06 PROCEDURE — 93005 ELECTROCARDIOGRAM TRACING: CPT | Performed by: EMERGENCY MEDICINE

## 2021-04-06 PROCEDURE — 85025 COMPLETE CBC W/AUTO DIFF WBC: CPT

## 2021-04-06 PROCEDURE — 71045 X-RAY EXAM CHEST 1 VIEW: CPT

## 2021-04-06 PROCEDURE — 700102 HCHG RX REV CODE 250 W/ 637 OVERRIDE(OP): Performed by: STUDENT IN AN ORGANIZED HEALTH CARE EDUCATION/TRAINING PROGRAM

## 2021-04-06 PROCEDURE — 84484 ASSAY OF TROPONIN QUANT: CPT

## 2021-04-06 PROCEDURE — 85520 HEPARIN ASSAY: CPT

## 2021-04-06 RX ORDER — POLYETHYLENE GLYCOL 3350 17 G/17G
1 POWDER, FOR SOLUTION ORAL
Status: DISCONTINUED | OUTPATIENT
Start: 2021-04-06 | End: 2021-04-09 | Stop reason: HOSPADM

## 2021-04-06 RX ORDER — DULAGLUTIDE 1.5 MG/.5ML
0.5 INJECTION, SOLUTION SUBCUTANEOUS
COMMUNITY
End: 2023-02-02 | Stop reason: SDUPTHER

## 2021-04-06 RX ORDER — LISINOPRIL 10 MG/1
10 TABLET ORAL DAILY
COMMUNITY

## 2021-04-06 RX ORDER — BISACODYL 10 MG
10 SUPPOSITORY, RECTAL RECTAL
Status: DISCONTINUED | OUTPATIENT
Start: 2021-04-06 | End: 2021-04-09 | Stop reason: HOSPADM

## 2021-04-06 RX ORDER — ATORVASTATIN CALCIUM 40 MG/1
40 TABLET, FILM COATED ORAL EVERY EVENING
Status: DISCONTINUED | OUTPATIENT
Start: 2021-04-06 | End: 2021-04-09 | Stop reason: HOSPADM

## 2021-04-06 RX ORDER — LEVOTHYROXINE SODIUM 0.07 MG/1
75 TABLET ORAL
Status: DISCONTINUED | OUTPATIENT
Start: 2021-04-07 | End: 2021-04-09 | Stop reason: HOSPADM

## 2021-04-06 RX ORDER — HEPARIN SODIUM 5000 [USP'U]/100ML
0-30 INJECTION, SOLUTION INTRAVENOUS CONTINUOUS
Status: DISCONTINUED | OUTPATIENT
Start: 2021-04-06 | End: 2021-04-07

## 2021-04-06 RX ORDER — LABETALOL HYDROCHLORIDE 5 MG/ML
10 INJECTION, SOLUTION INTRAVENOUS EVERY 4 HOURS PRN
Status: DISCONTINUED | OUTPATIENT
Start: 2021-04-06 | End: 2021-04-09 | Stop reason: HOSPADM

## 2021-04-06 RX ORDER — DEXTROSE MONOHYDRATE 25 G/50ML
50 INJECTION, SOLUTION INTRAVENOUS
Status: DISCONTINUED | OUTPATIENT
Start: 2021-04-06 | End: 2021-04-09 | Stop reason: HOSPADM

## 2021-04-06 RX ORDER — NITROGLYCERIN 0.4 MG/1
0.4 TABLET SUBLINGUAL
Status: DISCONTINUED | OUTPATIENT
Start: 2021-04-06 | End: 2021-04-09 | Stop reason: HOSPADM

## 2021-04-06 RX ORDER — INSULIN GLARGINE 100 [IU]/ML
30 INJECTION, SOLUTION SUBCUTANEOUS EVERY EVENING
COMMUNITY
End: 2023-02-02 | Stop reason: SDUPTHER

## 2021-04-06 RX ORDER — OMEPRAZOLE 20 MG/1
20 CAPSULE, DELAYED RELEASE ORAL DAILY
Status: DISCONTINUED | OUTPATIENT
Start: 2021-04-06 | End: 2021-04-09 | Stop reason: HOSPADM

## 2021-04-06 RX ORDER — MORPHINE SULFATE 4 MG/ML
2 INJECTION, SOLUTION INTRAMUSCULAR; INTRAVENOUS EVERY 4 HOURS PRN
Status: DISPENSED | OUTPATIENT
Start: 2021-04-06 | End: 2021-04-07

## 2021-04-06 RX ORDER — FLUTICASONE PROPIONATE 44 UG/1
1 AEROSOL, METERED RESPIRATORY (INHALATION)
Status: DISCONTINUED | OUTPATIENT
Start: 2021-04-07 | End: 2021-04-09 | Stop reason: HOSPADM

## 2021-04-06 RX ORDER — ACETAMINOPHEN 325 MG/1
650 TABLET ORAL EVERY 6 HOURS PRN
Status: DISCONTINUED | OUTPATIENT
Start: 2021-04-06 | End: 2021-04-09 | Stop reason: HOSPADM

## 2021-04-06 RX ORDER — LISINOPRIL 10 MG/1
10 TABLET ORAL DAILY
Status: DISCONTINUED | OUTPATIENT
Start: 2021-04-07 | End: 2021-04-09 | Stop reason: HOSPADM

## 2021-04-06 RX ORDER — EMPAGLIFLOZIN, METFORMIN HYDROCHLORIDE 12.5; 1 MG/1; MG/1
2 TABLET, EXTENDED RELEASE ORAL EVERY MORNING
COMMUNITY
End: 2023-02-02 | Stop reason: SDUPTHER

## 2021-04-06 RX ORDER — HEPARIN SODIUM 1000 [USP'U]/ML
2000 INJECTION, SOLUTION INTRAVENOUS; SUBCUTANEOUS PRN
Status: DISCONTINUED | OUTPATIENT
Start: 2021-04-06 | End: 2021-04-07

## 2021-04-06 RX ORDER — ASPIRIN 81 MG/1
81 TABLET, CHEWABLE ORAL DAILY
Status: DISCONTINUED | OUTPATIENT
Start: 2021-04-07 | End: 2021-04-07

## 2021-04-06 RX ORDER — INSULIN GLARGINE 100 [IU]/ML
30 INJECTION, SOLUTION SUBCUTANEOUS EVERY EVENING
Status: DISCONTINUED | OUTPATIENT
Start: 2021-04-06 | End: 2021-04-09 | Stop reason: HOSPADM

## 2021-04-06 RX ORDER — ASPIRIN 325 MG
325 TABLET ORAL ONCE
Status: COMPLETED | OUTPATIENT
Start: 2021-04-06 | End: 2021-04-06

## 2021-04-06 RX ORDER — CARVEDILOL 3.12 MG/1
3.12 TABLET ORAL 2 TIMES DAILY WITH MEALS
Status: DISCONTINUED | OUTPATIENT
Start: 2021-04-06 | End: 2021-04-09 | Stop reason: HOSPADM

## 2021-04-06 RX ORDER — LEVOTHYROXINE SODIUM 0.07 MG/1
75 TABLET ORAL
COMMUNITY

## 2021-04-06 RX ORDER — HEPARIN SODIUM 1000 [USP'U]/ML
4000 INJECTION, SOLUTION INTRAVENOUS; SUBCUTANEOUS ONCE
Status: COMPLETED | OUTPATIENT
Start: 2021-04-06 | End: 2021-04-06

## 2021-04-06 RX ORDER — AMOXICILLIN 250 MG
2 CAPSULE ORAL 2 TIMES DAILY
Status: DISCONTINUED | OUTPATIENT
Start: 2021-04-06 | End: 2021-04-09 | Stop reason: HOSPADM

## 2021-04-06 RX ADMIN — CARVEDILOL 3.12 MG: 3.12 TABLET, FILM COATED ORAL at 16:41

## 2021-04-06 RX ADMIN — OMEPRAZOLE 20 MG: 20 CAPSULE, DELAYED RELEASE ORAL at 15:09

## 2021-04-06 RX ADMIN — ATORVASTATIN CALCIUM 40 MG: 40 TABLET, FILM COATED ORAL at 17:02

## 2021-04-06 RX ADMIN — IOHEXOL 69 ML: 350 INJECTION, SOLUTION INTRAVENOUS at 14:21

## 2021-04-06 RX ADMIN — HEPARIN SODIUM 12 UNITS/KG/HR: 5000 INJECTION, SOLUTION INTRAVENOUS at 16:41

## 2021-04-06 RX ADMIN — MORPHINE SULFATE 2 MG: 4 INJECTION INTRAVENOUS at 16:36

## 2021-04-06 RX ADMIN — NITROGLYCERIN 0.4 MG: 0.4 TABLET, ORALLY DISINTEGRATING SUBLINGUAL at 15:09

## 2021-04-06 RX ADMIN — ASPIRIN 325 MG ORAL TABLET 325 MG: 325 PILL ORAL at 15:09

## 2021-04-06 RX ADMIN — ENOXAPARIN SODIUM 40 MG: 40 INJECTION SUBCUTANEOUS at 15:10

## 2021-04-06 RX ADMIN — HEPARIN SODIUM 4000 UNITS: 1000 INJECTION, SOLUTION INTRAVENOUS; SUBCUTANEOUS at 16:46

## 2021-04-06 RX ADMIN — MORPHINE SULFATE 2 MG: 4 INJECTION INTRAVENOUS at 23:13

## 2021-04-06 ASSESSMENT — ENCOUNTER SYMPTOMS
FOCAL WEAKNESS: 0
CHILLS: 0
SHORTNESS OF BREATH: 1
MYALGIAS: 0
BLURRED VISION: 0
HEMOPTYSIS: 0
NAUSEA: 0
SINUS PAIN: 0
EYE PAIN: 0
WEAKNESS: 0
HEADACHES: 0
COUGH: 0
VOMITING: 0
ORTHOPNEA: 0
DIARRHEA: 0
TREMORS: 0
FEVER: 0

## 2021-04-06 ASSESSMENT — LIFESTYLE VARIABLES
HAVE YOU EVER FELT YOU SHOULD CUT DOWN ON YOUR DRINKING: NO
AVERAGE NUMBER OF DAYS PER WEEK YOU HAVE A DRINK CONTAINING ALCOHOL: 0
CONSUMPTION TOTAL: NEGATIVE
TOTAL SCORE: 0
DOES PATIENT WANT TO STOP DRINKING: NO
ALCOHOL_USE: NO
EVER FELT BAD OR GUILTY ABOUT YOUR DRINKING: NO
TOTAL SCORE: 0
HAVE PEOPLE ANNOYED YOU BY CRITICIZING YOUR DRINKING: NO
EVER HAD A DRINK FIRST THING IN THE MORNING TO STEADY YOUR NERVES TO GET RID OF A HANGOVER: NO
HOW MANY TIMES IN THE PAST YEAR HAVE YOU HAD 5 OR MORE DRINKS IN A DAY: 0
ON A TYPICAL DAY WHEN YOU DRINK ALCOHOL HOW MANY DRINKS DO YOU HAVE: 0
TOTAL SCORE: 0

## 2021-04-06 ASSESSMENT — PAIN DESCRIPTION - PAIN TYPE
TYPE: ACUTE PAIN

## 2021-04-06 ASSESSMENT — HEART SCORE
AGE: 45-64
HISTORY: SLIGHTLY SUSPICIOUS
ECG: NON-SPECIFIC REPOLARIZATION DISTURBANCE

## 2021-04-06 ASSESSMENT — PATIENT HEALTH QUESTIONNAIRE - PHQ9
1. LITTLE INTEREST OR PLEASURE IN DOING THINGS: NOT AT ALL
2. FEELING DOWN, DEPRESSED, IRRITABLE, OR HOPELESS: NOT AT ALL
SUM OF ALL RESPONSES TO PHQ9 QUESTIONS 1 AND 2: 0

## 2021-04-06 ASSESSMENT — COGNITIVE AND FUNCTIONAL STATUS - GENERAL
SUGGESTED CMS G CODE MODIFIER DAILY ACTIVITY: CH
CLIMB 3 TO 5 STEPS WITH RAILING: A LITTLE
DAILY ACTIVITIY SCORE: 24
MOBILITY SCORE: 23
SUGGESTED CMS G CODE MODIFIER MOBILITY: CI

## 2021-04-06 ASSESSMENT — FIBROSIS 4 INDEX: FIB4 SCORE: 2.35

## 2021-04-06 NOTE — ED PROVIDER NOTES
ED Provider Note    Scribed for Yeimi Petty M.D. by Princess Dumont. 4/6/2021, 11:32 AM.    Primary care provider: GIL Kimball  Means of arrival: Walk in  History obtained from: patient   History limited by: none    CHIEF COMPLAINT  Chief Complaint   Patient presents with   • Chest Pain     patient c/o chest pain radiating from neck down to center of chest x 2 weeks.        HPI  Jessee Martinez is a 60 y.o. male who presents to the Emergency Department for chest pain onset 2 weeks ago. Patient states he tried a new e-cigarette 2 weeks ago and has been having worsening, central chest pain since. His pain is exacerbated with breathing. His pain radiated from the stop of his esophagus into his upper abdomen along the sternum. Denies fevers, cough, new leg swelling, hemoptysis, vomiting, or appetite changes. He has a past medical history of diabetes and social history of smoking and adds he switched to vaping instead of cigarettes. Patient adds that he recently received his COVID vaccine and experienced a different sensation in his throat 1 week ago following the vaccine.     REVIEW OF SYSTEMS  Pertinent positives include chest pain. Pertinent negatives include no fevers, cough, new leg swelling, hemoptysis, vomiting, or appetite changes. All other systems reviewed and negative.     PAST MEDICAL HISTORY   has a past medical history of Diabetes (HCC), Hypertension, and Thyroid disorder (2012).    SURGICAL HISTORY  patient denies any surgical history    SOCIAL HISTORY  Social History     Tobacco Use   • Smoking status: Current Every Day Smoker     Packs/day: 0.50     Start date: 8/7/2017   • Smokeless tobacco: Never Used   Substance Use Topics   • Alcohol use: No   • Drug use: No      Social History     Substance and Sexual Activity   Drug Use No       FAMILY HISTORY  History reviewed. No pertinent family history.    CURRENT MEDICATIONS  Home Medications     Reviewed by Sherice Glez R.N. (Registered  "Nurse) on 21 at 1012  Med List Status: Not Addressed   Medication Last Dose Status   levothyroxine (SYNTHROID) 50 MCG Tab 2021 Active   metformin (GLUCOPHAGE) 500 MG Tab not taking Active   methadone (DOLOPHINE) 10 MG Tab 2021 Active              ALLERGIES  No Known Allergies    PHYSICAL EXAM  VITAL SIGNS: /95   Pulse 94   Temp 36.1 °C (97 °F) (Temporal)   Resp 18   Ht 1.803 m (5' 11\")   Wt 113 kg (250 lb)   SpO2 96%   BMI 34.87 kg/m²     Constitutional: Well developed, No acute distress, Non-toxic appearance.   HENT: Normocephalic, Atraumatic, Bilateral external ears normal, Nose normal.   Eyes: PERRL, EOMI, Conjunctiva normal.   Neck: Normal range of motion, No tenderness, Supple.    Cardiovascular: Normal heart rate, Normal rhythm.   Thorax & Lungs: Normal breath sounds, No respiratory distress, no wheezing, minimal sternal tenderness to palpation, no crepitus.   Abdomen: Benign abdominal exam, no tenderness, no distention, no guarding, no rebound.   Skin: Warm, Dry, No erythema, No rash.   Back: No tenderness, No CVA tenderness.   Extremities: Intact distal pulses, No edema, No tenderness   Neurologic: Alert & oriented x 3, Normal motor function, Normal sensory function, No focal deficits noted.  Psychiatric: Appropriate                                                     DIAGNOSTIC STUDIES / PROCEDURES\    LABS  Results for orders placed or performed during the hospital encounter of 21   EKG (NOW)   Result Value Ref Range    Report       [facility] Emergency Dept.    Test Date:  2021  Pt Name:    BRYSON RAM               Department: ER  MRN:        [id number]                      Room:       RD 07  Gender:     Male                         Technician: [id number]  :        1960                   Requested By:ER TRIAGE PROTOCOL  Order #:    [id number]                    Reading MD: Yeimi Barragan    Measurements  Intervals                            "     Axis  Rate:       92                           P:          58  MO:         220                          QRS:        -34  QRSD:       120                          T:          72  QT:         380  QTc:        471    Interpretive Statements  SINUS RHYTHM  FIRST DEGREE AV BLOCK  INCOMPLETE LEFT BUNDLE BRANCH BLOCK  LVH WITH SECONDARY REPOLARIZATION ABNORMALITY  Compared to ECG 08/21/2017 13:49:51  First degree AV block now present  Left bundle-branch block now present  Left ventricular hypertrophy now present  Early repolarization now present   Ventricular-paced complex(es) or rhythm no longer present  Electronically Signed On 4-6-2021 12:11:26 PDT by Yeimi Barragan       All labs reviewed by me.    EKG  12 lead EKG interpreted by me as noted above.    RADIOLOGY  CT-CTA CHEST PULMONARY ARTERY W/ RECONS   Final Result      1.  No CT evidence for pulmonary emboli.   2.  No pneumonia or pneumothorax.            DX-CHEST-PORTABLE (1 VIEW)   Final Result      Hypoinflation with minimal LEFT lung base atelectasis.        The radiologist's interpretation of all radiological studies have been reviewed by me.    COURSE & MEDICAL DECISION MAKING  Nursing notes, VS, PMSFHx reviewed in chart.     11:32 AM Patient seen and examined at bedside. The patient presents with chest pain.  Patient's pain began after vaping.  Patient is not hypoxic here any acute respiratory distress.  Patient denies previous history of cardiac problems.  Pain runs along the esophagus into the sternum.   patient is not had any fevers.  It did not occur after vomiting.  I think infectious etiology is unlikely but in the differential.  No signs of sepsis in the vital signs.  Patient not coughing up any blood.  No history of PE in the past and no leg swelling.  EKG does show some nonspecific changes compared to his previous EKG, therefore cardiac etiology is also possible.  Also with a history of vaping I am concerned about some possible mediastinal air  that is causing his discomfort and therefore a CT of the chest will be ordered.  Ordered for chest x-ray, CTA chest, CBC w/ diff, CMP, troponin, and EKG to evaluate.    2:05 PM laboratory studies have returned and show an elevated troponin of 81.  Patient does have EKG changes that are new.  Therefore patient will be hospitalized for further evaluation of his heart.  CT of the chest is normal.  Patient overall looks well here without any hypoxia and normal blood pressure.  Patient understands will be hospitalized.    Discussed with the hospitalist who will see the patient.        FINAL IMPRESSION  1. Acute chest pain    2. Shortness of breath    3. NSTEMI (non-ST elevated myocardial infarction) (HCC)          Princess MAGALLANES (Scribe), am scribing for, and in the presence of, Yeimi Petty M.D..    Electronically signed by: Princess Dumont ([place]), 4/6/2021    IYeimi M.D. personally performed the services described in this documentation, as scribed by Princess Dumont in my presence, and it is both accurate and complete. C    The note accurately reflects work and decisions made by me.  Yeimi Petty M.D.  4/6/2021  2:06 PM

## 2021-04-06 NOTE — ASSESSMENT & PLAN NOTE
Patient recently switched from tobacco to vaping  Counseled patient on abstinence for 10 minutes

## 2021-04-06 NOTE — ASSESSMENT & PLAN NOTE
Continue with the Lantus 30 daily, patient on 50 units at home  Continue with sliding scale insulin and hypoglycemia protocol  Titrate Lantus as tolerated  Last A1c 1 month ago 7.1  hold oral hypoglycemic agents

## 2021-04-06 NOTE — H&P
Hospital Medicine History & Physical Note    Date of Service  4/6/2021    Primary Care Physician  GIL Kimball    Consultants  Cardiology    Code Status  Full Code    Chief Complaint  Chief Complaint   Patient presents with   • Chest Pain     patient c/o chest pain radiating from neck down to center of chest x 2 weeks.        History of Presenting Illness  60 y.o. male who presented 4/6/2021 with past medical history of type 2 diabetes on insulin, hypertension, history of tobacco use, hypothyroid on Synthroid presented to the ER with chief complaint of chest pain for past 2 days.  The chest pain started about 2 weeks ago after he started vaping vaping, described the chest pain is pressure, worsening with exertion, 5 out of 10 severity, radiating to jaw, associated with exertional shortness of breath.  He denies any nausea, vomiting, palpitation, heartburn, abdominal pain, syncope, focal weakness, LOC, bowel or bladder habit changes.  In the ER his labs are significant for troponin of 81, EKG with new onset LBBB, CTA no CT evidence of PE or pneumonia/pneumothorax.  Patient admitted to telemetry for further management to rule out ACS    Review of Systems  Review of Systems   Constitutional: Negative for chills and fever.   HENT: Negative for ear pain and sinus pain.    Eyes: Negative for blurred vision and pain.   Respiratory: Positive for shortness of breath. Negative for cough and hemoptysis.    Cardiovascular: Positive for chest pain. Negative for orthopnea.   Gastrointestinal: Negative for diarrhea, nausea and vomiting.   Genitourinary: Negative for dysuria and hematuria.   Musculoskeletal: Negative for myalgias.   Skin: Negative for itching.   Neurological: Negative for tremors, focal weakness, weakness and headaches.   Psychiatric/Behavioral: Negative for suicidal ideas.       Past Medical History   has a past medical history of Diabetes (HCC), Hypertension, and Thyroid disorder (2012).    Surgical  History   has no past surgical history on file.     Family History  family history is not on file.     Social History   reports that he has been smoking. He started smoking about 3 years ago. He has been smoking about 0.50 packs per day. He has never used smokeless tobacco. He reports that he does not drink alcohol and does not use drugs.    Allergies  No Known Allergies    Medications  Prior to Admission Medications   Prescriptions Last Dose Informant Patient Reported? Taking?   Dulaglutide (TRULICITY) 1.5 MG/0.5ML Solution Pen-injector 3/31/2021 at 0930 Patient Yes Yes   Sig: Inject 0.5 mL under the skin every Wednesday.   Empagliflozin-metFORMIN HCl ER (SYNJARDY XR) 12.5-1000 MG TABLET SR 24 HR 4/6/2021 at 0900 Patient Yes Yes   Sig: Take 2 Tablets by mouth every morning.   Fluticasone-Umeclidin-Vilant (TRELEGY ELLIPTA) 200-62.5-25 MCG/INH AEROSOL POWDER, BREATH ACTIVATED 4/6/2021 at 0900 Patient Yes Yes   Sig: Inhale 1 Puff every day.   insulin glargine (LANTUS) 100 UNIT/ML Solution 4/5/2021 at 2030 Patient Yes Yes   Sig: Inject 50 Units under the skin every evening.   levothyroxine (SYNTHROID) 75 MCG Tab 4/6/2021 at 0800 Patient Yes Yes   Sig: Take 75 mcg by mouth every morning on an empty stomach.   lisinopril (PRINIVIL) 10 MG Tab 4/6/2021 at 0900 Patient Yes Yes   Sig: Take 10 mg by mouth every day.      Facility-Administered Medications: None       Physical Exam  Temp:  [36.1 °C (97 °F)-36.2 °C (97.2 °F)] 36.2 °C (97.2 °F)  Pulse:  [62-94] 86  Resp:  [16-20] 18  BP: (111-178)/(66-96) 115/76  SpO2:  [89 %-96 %] 95 %    Physical Exam  Vitals reviewed.   Constitutional:       Appearance: Normal appearance. He is obese.   HENT:      Head: Normocephalic and atraumatic.      Right Ear: External ear normal.      Left Ear: External ear normal.      Mouth/Throat:      Mouth: Mucous membranes are moist.   Eyes:      Extraocular Movements: Extraocular movements intact.      Conjunctiva/sclera: Conjunctivae normal.       Pupils: Pupils are equal, round, and reactive to light.   Cardiovascular:      Rate and Rhythm: Normal rate and regular rhythm.      Pulses: Normal pulses.      Heart sounds: Normal heart sounds.   Pulmonary:      Effort: Pulmonary effort is normal.      Breath sounds: Normal breath sounds.   Abdominal:      General: Abdomen is flat. Bowel sounds are normal. There is distension.      Palpations: Abdomen is soft.   Musculoskeletal:         General: Normal range of motion.   Skin:     General: Skin is warm.      Capillary Refill: Capillary refill takes less than 2 seconds.   Neurological:      General: No focal deficit present.      Mental Status: He is alert and oriented to person, place, and time.   Psychiatric:         Mood and Affect: Mood normal.         Laboratory:  Recent Labs     04/06/21  1235   WBC 6.2   RBC 5.23   HEMOGLOBIN 15.4   HEMATOCRIT 45.1   MCV 86.2   MCH 29.4   MCHC 34.1   RDW 41.3   PLATELETCT 157*   MPV 11.2     Recent Labs     04/06/21  1235   SODIUM 136   POTASSIUM 4.4   CHLORIDE 103   CO2 25   GLUCOSE 175*   BUN 14   CREATININE 0.75   CALCIUM 8.9     Recent Labs     04/06/21  1235   ALTSGPT 45   ASTSGOT 40   ALKPHOSPHAT 74   TBILIRUBIN 0.3   GLUCOSE 175*         No results for input(s): NTPROBNP in the last 72 hours.      Recent Labs     04/06/21  1235   TROPONINT 81*       Imaging:  CT-CTA CHEST PULMONARY ARTERY W/ RECONS   Final Result      1.  No CT evidence for pulmonary emboli.   2.  No pneumonia or pneumothorax.            DX-CHEST-PORTABLE (1 VIEW)   Final Result      Hypoinflation with minimal LEFT lung base atelectasis.      EC-ECHOCARDIOGRAM COMPLETE W/O CONT    (Results Pending)         Assessment/Plan:  I anticipate this patient will require at least two midnights for appropriate medical management, necessitating inpatient admission.    NSTEMI (non-ST elevated myocardial infarction) (HCC)- (present on admission)  Assessment & Plan  Patient with worsening chest pain, EKG with new  LBBB, tropes 81  Heart score 6  Status post loading aspirin 324, continue with aspirin 81 mg p.o.  Started on Heparin GTT  Started statin, beta-blocker, lisinopril 10 mg  Follow-up echocardiogram  Follow-up cardiology consult  F/u serial Trops, EKG, ECHO    Tobacco abuse- (present on admission)  Assessment & Plan  Patient recently switched from tobacco to vaping  Counseled patient on abstinence for 10 minutes      Acquired hypothyroidism- (present on admission)  Assessment & Plan  Continue with Synthroid 75 mcg p.o. q. 6 AM  Follow-up TSH    Essential hypertension- (present on admission)  Assessment & Plan  Continue with lisinopril 10 mg p.o. once daily  Start on Coreg 3.125 milligrams twice daily      Type 2 diabetes mellitus without complication, with long-term current use of insulin (HCC)- (present on admission)  Assessment & Plan  Continue with the Lantus 30 daily, patient on 50 units at home  Continue with sliding scale insulin and hypoglycemia protocol  Titrate Lantus as tolerated  Last A1c 1 month ago 7.1  hold oral hypoglycemic agents

## 2021-04-06 NOTE — ASSESSMENT & PLAN NOTE
Patient with worsening chest pain, EKG with new LBBB, tropes 81  Heart score 6  Started statin, beta-blocker, lisinopril 10 mg  Echocardiogram unremarkable. EF 60 %   Cath x2 04/07. MISHA on OM1  meds adjusted per cardiology  Echo repeat 4/8-continue to monitor

## 2021-04-06 NOTE — ED NOTES
Med rec updated and complete. Allergies reviewed.  Met with pt at bedside. Pt requested that I call his home pharmacy ([facility]) [address] to confirm current medications.  No recent antibiotic use.       HOME PHARMACY  [facility] [phone].    If discharged on any medications   Pt will use   [facility] [phone]

## 2021-04-06 NOTE — ED NOTES
Pt to red 7.  Connected to cardiac monitor, BP cuff, and continuous pulse ox upon arrival.  Pt in gown, call light in reach, bed in lowest position.  Chart up for ERP.

## 2021-04-06 NOTE — ED TRIAGE NOTES
Jessee Martinez  .  Chief Complaint   Patient presents with   • Chest Pain     patient c/o chest pain radiating from neck down to center of chest x 2 weeks.      Patient to triage with above complaint. Patient reports chest pain started after vaping.   EKG completed.   Patient to lobby and instructed to inform staff of any needs.

## 2021-04-07 ENCOUNTER — APPOINTMENT (OUTPATIENT)
Dept: CARDIOLOGY | Facility: MEDICAL CENTER | Age: 61
DRG: 247 | End: 2021-04-07
Attending: PHYSICIAN ASSISTANT
Payer: MEDICARE

## 2021-04-07 ENCOUNTER — APPOINTMENT (OUTPATIENT)
Dept: CARDIOLOGY | Facility: MEDICAL CENTER | Age: 61
DRG: 247 | End: 2021-04-07
Attending: INTERNAL MEDICINE
Payer: MEDICARE

## 2021-04-07 ENCOUNTER — APPOINTMENT (OUTPATIENT)
Dept: CARDIOLOGY | Facility: MEDICAL CENTER | Age: 61
DRG: 247 | End: 2021-04-07
Attending: STUDENT IN AN ORGANIZED HEALTH CARE EDUCATION/TRAINING PROGRAM
Payer: MEDICARE

## 2021-04-07 ENCOUNTER — APPOINTMENT (OUTPATIENT)
Dept: RADIOLOGY | Facility: MEDICAL CENTER | Age: 61
DRG: 247 | End: 2021-04-07
Attending: STUDENT IN AN ORGANIZED HEALTH CARE EDUCATION/TRAINING PROGRAM
Payer: MEDICARE

## 2021-04-07 LAB
ALBUMIN SERPL BCP-MCNC: 3.7 G/DL (ref 3.2–4.9)
ALBUMIN/GLOB SERPL: 1.3 G/DL
ALP SERPL-CCNC: 66 U/L (ref 30–99)
ALT SERPL-CCNC: 45 U/L (ref 2–50)
ANION GAP SERPL CALC-SCNC: 9 MMOL/L (ref 7–16)
AST SERPL-CCNC: 52 U/L (ref 12–45)
BILIRUB SERPL-MCNC: 0.3 MG/DL (ref 0.1–1.5)
BUN SERPL-MCNC: 12 MG/DL (ref 8–22)
CALCIUM SERPL-MCNC: 8.7 MG/DL (ref 8.5–10.5)
CHLORIDE SERPL-SCNC: 102 MMOL/L (ref 96–112)
CHOLEST SERPL-MCNC: 117 MG/DL (ref 100–199)
CO2 SERPL-SCNC: 26 MMOL/L (ref 20–33)
CREAT SERPL-MCNC: 0.79 MG/DL (ref 0.5–1.4)
EKG IMPRESSION: NORMAL
EKG IMPRESSION: NORMAL
ERYTHROCYTE [DISTWIDTH] IN BLOOD BY AUTOMATED COUNT: 41.2 FL (ref 35.9–50)
EST. AVERAGE GLUCOSE BLD GHB EST-MCNC: 157 MG/DL
GLOBULIN SER CALC-MCNC: 2.8 G/DL (ref 1.9–3.5)
GLUCOSE BLD-MCNC: 106 MG/DL (ref 65–99)
GLUCOSE BLD-MCNC: 111 MG/DL (ref 65–99)
GLUCOSE BLD-MCNC: 116 MG/DL (ref 65–99)
GLUCOSE BLD-MCNC: 122 MG/DL (ref 65–99)
GLUCOSE BLD-MCNC: 135 MG/DL (ref 65–99)
GLUCOSE SERPL-MCNC: 129 MG/DL (ref 65–99)
HBA1C MFR BLD: 7.1 % (ref 4–5.6)
HCT VFR BLD AUTO: 42.3 % (ref 42–52)
HDLC SERPL-MCNC: 33 MG/DL
HGB BLD-MCNC: 14.7 G/DL (ref 14–18)
LDLC SERPL CALC-MCNC: 56 MG/DL
LV EJECT FRACT  99904: 60
LV EJECT FRACT MOD 2C 99903: 60.33
LV EJECT FRACT MOD 4C 99902: 57.06
LV EJECT FRACT MOD BP 99901: 57.54
MAGNESIUM SERPL-MCNC: 2.1 MG/DL (ref 1.5–2.5)
MCH RBC QN AUTO: 29.6 PG (ref 27–33)
MCHC RBC AUTO-ENTMCNC: 34.8 G/DL (ref 33.7–35.3)
MCV RBC AUTO: 85.3 FL (ref 81.4–97.8)
PLATELET # BLD AUTO: 153 K/UL (ref 164–446)
PMV BLD AUTO: 11.2 FL (ref 9–12.9)
POTASSIUM SERPL-SCNC: 4 MMOL/L (ref 3.6–5.5)
PROT SERPL-MCNC: 6.5 G/DL (ref 6–8.2)
RBC # BLD AUTO: 4.96 M/UL (ref 4.7–6.1)
SODIUM SERPL-SCNC: 137 MMOL/L (ref 135–145)
TRIGL SERPL-MCNC: 139 MG/DL (ref 0–149)
TROPONIN T SERPL-MCNC: 241 NG/L (ref 6–19)
TROPONIN T SERPL-MCNC: 327 NG/L (ref 6–19)
TSH SERPL DL<=0.005 MIU/L-ACNC: 2.84 UIU/ML (ref 0.38–5.33)
UFH PPP CHRO-ACNC: 0.28 IU/ML
UFH PPP CHRO-ACNC: 0.34 IU/ML
WBC # BLD AUTO: 8 K/UL (ref 4.8–10.8)

## 2021-04-07 PROCEDURE — 93458 L HRT ARTERY/VENTRICLE ANGIO: CPT | Mod: 26,59,78 | Performed by: INTERNAL MEDICINE

## 2021-04-07 PROCEDURE — 4A023N7 MEASUREMENT OF CARDIAC SAMPLING AND PRESSURE, LEFT HEART, PERCUTANEOUS APPROACH: ICD-10-PCS | Performed by: INTERNAL MEDICINE

## 2021-04-07 PROCEDURE — 83735 ASSAY OF MAGNESIUM: CPT

## 2021-04-07 PROCEDURE — 92928 PRQ TCAT PLMT NTRAC ST 1 LES: CPT | Mod: LC | Performed by: INTERNAL MEDICINE

## 2021-04-07 PROCEDURE — 84443 ASSAY THYROID STIM HORMONE: CPT

## 2021-04-07 PROCEDURE — 99233 SBSQ HOSP IP/OBS HIGH 50: CPT | Performed by: INTERNAL MEDICINE

## 2021-04-07 PROCEDURE — 99152 MOD SED SAME PHYS/QHP 5/>YRS: CPT | Performed by: INTERNAL MEDICINE

## 2021-04-07 PROCEDURE — 85347 COAGULATION TIME ACTIVATED: CPT

## 2021-04-07 PROCEDURE — 93306 TTE W/DOPPLER COMPLETE: CPT

## 2021-04-07 PROCEDURE — 027034Z DILATION OF CORONARY ARTERY, ONE ARTERY WITH DRUG-ELUTING INTRALUMINAL DEVICE, PERCUTANEOUS APPROACH: ICD-10-PCS | Performed by: INTERNAL MEDICINE

## 2021-04-07 PROCEDURE — A9270 NON-COVERED ITEM OR SERVICE: HCPCS

## 2021-04-07 PROCEDURE — 80053 COMPREHEN METABOLIC PANEL: CPT

## 2021-04-07 PROCEDURE — 700101 HCHG RX REV CODE 250: Performed by: STUDENT IN AN ORGANIZED HEALTH CARE EDUCATION/TRAINING PROGRAM

## 2021-04-07 PROCEDURE — 700111 HCHG RX REV CODE 636 W/ 250 OVERRIDE (IP): Performed by: INTERNAL MEDICINE

## 2021-04-07 PROCEDURE — 700102 HCHG RX REV CODE 250 W/ 637 OVERRIDE(OP): Performed by: INTERNAL MEDICINE

## 2021-04-07 PROCEDURE — 700102 HCHG RX REV CODE 250 W/ 637 OVERRIDE(OP): Performed by: STUDENT IN AN ORGANIZED HEALTH CARE EDUCATION/TRAINING PROGRAM

## 2021-04-07 PROCEDURE — C1887 CATHETER, GUIDING: HCPCS

## 2021-04-07 PROCEDURE — 92978 ENDOLUMINL IVUS OCT C 1ST: CPT | Mod: 26,78,LC | Performed by: INTERNAL MEDICINE

## 2021-04-07 PROCEDURE — 36415 COLL VENOUS BLD VENIPUNCTURE: CPT

## 2021-04-07 PROCEDURE — 85027 COMPLETE CBC AUTOMATED: CPT

## 2021-04-07 PROCEDURE — B240ZZ3 ULTRASONOGRAPHY OF SINGLE CORONARY ARTERY, INTRAVASCULAR: ICD-10-PCS | Performed by: INTERNAL MEDICINE

## 2021-04-07 PROCEDURE — 700111 HCHG RX REV CODE 636 W/ 250 OVERRIDE (IP)

## 2021-04-07 PROCEDURE — 82962 GLUCOSE BLOOD TEST: CPT | Mod: 91

## 2021-04-07 PROCEDURE — 700102 HCHG RX REV CODE 250 W/ 637 OVERRIDE(OP)

## 2021-04-07 PROCEDURE — 93306 TTE W/DOPPLER COMPLETE: CPT | Mod: 26 | Performed by: INTERNAL MEDICINE

## 2021-04-07 PROCEDURE — 93005 ELECTROCARDIOGRAM TRACING: CPT | Performed by: INTERNAL MEDICINE

## 2021-04-07 PROCEDURE — 94640 AIRWAY INHALATION TREATMENT: CPT

## 2021-04-07 PROCEDURE — C1894 INTRO/SHEATH, NON-LASER: HCPCS

## 2021-04-07 PROCEDURE — 700101 HCHG RX REV CODE 250

## 2021-04-07 PROCEDURE — A9270 NON-COVERED ITEM OR SERVICE: HCPCS | Performed by: INTERNAL MEDICINE

## 2021-04-07 PROCEDURE — 700111 HCHG RX REV CODE 636 W/ 250 OVERRIDE (IP): Performed by: STUDENT IN AN ORGANIZED HEALTH CARE EDUCATION/TRAINING PROGRAM

## 2021-04-07 PROCEDURE — 85520 HEPARIN ASSAY: CPT

## 2021-04-07 PROCEDURE — B2151ZZ FLUOROSCOPY OF LEFT HEART USING LOW OSMOLAR CONTRAST: ICD-10-PCS | Performed by: INTERNAL MEDICINE

## 2021-04-07 PROCEDURE — 80061 LIPID PANEL: CPT

## 2021-04-07 PROCEDURE — B2111ZZ FLUOROSCOPY OF MULTIPLE CORONARY ARTERIES USING LOW OSMOLAR CONTRAST: ICD-10-PCS | Performed by: INTERNAL MEDICINE

## 2021-04-07 PROCEDURE — A9270 NON-COVERED ITEM OR SERVICE: HCPCS | Performed by: STUDENT IN AN ORGANIZED HEALTH CARE EDUCATION/TRAINING PROGRAM

## 2021-04-07 PROCEDURE — 700117 HCHG RX CONTRAST REV CODE 255: Performed by: INTERNAL MEDICINE

## 2021-04-07 PROCEDURE — 84484 ASSAY OF TROPONIN QUANT: CPT

## 2021-04-07 PROCEDURE — 700105 HCHG RX REV CODE 258: Performed by: INTERNAL MEDICINE

## 2021-04-07 PROCEDURE — 770020 HCHG ROOM/CARE - TELE (206)

## 2021-04-07 PROCEDURE — 83036 HEMOGLOBIN GLYCOSYLATED A1C: CPT

## 2021-04-07 PROCEDURE — 93010 ELECTROCARDIOGRAM REPORT: CPT | Mod: 59 | Performed by: INTERNAL MEDICINE

## 2021-04-07 PROCEDURE — 99291 CRITICAL CARE FIRST HOUR: CPT | Mod: 25 | Performed by: INTERNAL MEDICINE

## 2021-04-07 RX ORDER — MORPHINE SULFATE 4 MG/ML
2 INJECTION, SOLUTION INTRAMUSCULAR; INTRAVENOUS ONCE
Status: COMPLETED | OUTPATIENT
Start: 2021-04-07 | End: 2021-04-07

## 2021-04-07 RX ORDER — PRASUGREL 10 MG/1
10 TABLET, FILM COATED ORAL DAILY
Status: DISCONTINUED | OUTPATIENT
Start: 2021-04-08 | End: 2021-04-08

## 2021-04-07 RX ORDER — METHADONE HYDROCHLORIDE 10 MG/ML
80 CONCENTRATE ORAL DAILY
COMMUNITY

## 2021-04-07 RX ORDER — IPRATROPIUM BROMIDE AND ALBUTEROL SULFATE 2.5; .5 MG/3ML; MG/3ML
3 SOLUTION RESPIRATORY (INHALATION)
Status: DISCONTINUED | OUTPATIENT
Start: 2021-04-07 | End: 2021-04-09 | Stop reason: HOSPADM

## 2021-04-07 RX ORDER — HEPARIN SODIUM 1000 [USP'U]/ML
INJECTION, SOLUTION INTRAVENOUS; SUBCUTANEOUS
Status: COMPLETED
Start: 2021-04-07 | End: 2021-04-07

## 2021-04-07 RX ORDER — MIDAZOLAM HYDROCHLORIDE 1 MG/ML
INJECTION INTRAMUSCULAR; INTRAVENOUS
Status: COMPLETED
Start: 2021-04-07 | End: 2021-04-07

## 2021-04-07 RX ORDER — LIDOCAINE HYDROCHLORIDE 20 MG/ML
INJECTION, SOLUTION INFILTRATION; PERINEURAL
Status: COMPLETED
Start: 2021-04-07 | End: 2021-04-07

## 2021-04-07 RX ORDER — VERAPAMIL HYDROCHLORIDE 2.5 MG/ML
INJECTION, SOLUTION INTRAVENOUS
Status: COMPLETED
Start: 2021-04-07 | End: 2021-04-07

## 2021-04-07 RX ORDER — SODIUM CHLORIDE 9 MG/ML
INJECTION, SOLUTION INTRAVENOUS CONTINUOUS
Status: DISCONTINUED | OUTPATIENT
Start: 2021-04-07 | End: 2021-04-08

## 2021-04-07 RX ORDER — SODIUM CHLORIDE 9 MG/ML
INJECTION, SOLUTION INTRAVENOUS CONTINUOUS
Status: CANCELLED | OUTPATIENT
Start: 2021-04-07

## 2021-04-07 RX ORDER — METHADONE HYDROCHLORIDE 10 MG/ML
80 CONCENTRATE ORAL DAILY
Status: DISCONTINUED | OUTPATIENT
Start: 2021-04-07 | End: 2021-04-07

## 2021-04-07 RX ORDER — BIVALIRUDIN 250 MG/5ML
INJECTION, POWDER, LYOPHILIZED, FOR SOLUTION INTRAVENOUS
Status: COMPLETED
Start: 2021-04-07 | End: 2021-04-07

## 2021-04-07 RX ORDER — MORPHINE SULFATE 4 MG/ML
INJECTION, SOLUTION INTRAMUSCULAR; INTRAVENOUS
Status: ACTIVE
Start: 2021-04-07 | End: 2021-04-08

## 2021-04-07 RX ORDER — PRASUGREL 10 MG/1
10 TABLET, FILM COATED ORAL DAILY
Status: DISCONTINUED | OUTPATIENT
Start: 2021-04-08 | End: 2021-04-09 | Stop reason: HOSPADM

## 2021-04-07 RX ORDER — HEPARIN SODIUM 200 [USP'U]/100ML
INJECTION, SOLUTION INTRAVENOUS
Status: COMPLETED
Start: 2021-04-07 | End: 2021-04-07

## 2021-04-07 RX ORDER — METHADONE HYDROCHLORIDE 40 MG/1
80 TABLET ORAL DAILY
Status: DISCONTINUED | OUTPATIENT
Start: 2021-04-07 | End: 2021-04-09 | Stop reason: HOSPADM

## 2021-04-07 RX ORDER — ONDANSETRON 2 MG/ML
INJECTION INTRAMUSCULAR; INTRAVENOUS
Status: COMPLETED
Start: 2021-04-07 | End: 2021-04-07

## 2021-04-07 RX ORDER — PRASUGREL 10 MG/1
TABLET, FILM COATED ORAL
Status: COMPLETED
Start: 2021-04-07 | End: 2021-04-07

## 2021-04-07 RX ORDER — HYDRALAZINE HYDROCHLORIDE 20 MG/ML
10 INJECTION INTRAMUSCULAR; INTRAVENOUS ONCE
Status: COMPLETED | OUTPATIENT
Start: 2021-04-07 | End: 2021-04-07

## 2021-04-07 RX ORDER — PRASUGREL 10 MG/1
60 TABLET, FILM COATED ORAL ONCE
Status: DISCONTINUED | OUTPATIENT
Start: 2021-04-07 | End: 2021-04-07

## 2021-04-07 RX ORDER — PRASUGREL 10 MG/1
10 TABLET, FILM COATED ORAL DAILY
Status: CANCELLED | OUTPATIENT
Start: 2021-04-08

## 2021-04-07 RX ADMIN — HEPARIN SODIUM: 200 INJECTION, SOLUTION INTRAVENOUS at 18:00

## 2021-04-07 RX ADMIN — ACETAMINOPHEN 650 MG: 325 TABLET, FILM COATED ORAL at 23:33

## 2021-04-07 RX ADMIN — MIDAZOLAM HYDROCHLORIDE 1.5 MG: 1 INJECTION, SOLUTION INTRAMUSCULAR; INTRAVENOUS at 15:59

## 2021-04-07 RX ADMIN — ATORVASTATIN CALCIUM 40 MG: 40 TABLET, FILM COATED ORAL at 20:20

## 2021-04-07 RX ADMIN — SODIUM CHLORIDE: 9 INJECTION, SOLUTION INTRAVENOUS at 16:44

## 2021-04-07 RX ADMIN — OMEPRAZOLE 20 MG: 20 CAPSULE, DELAYED RELEASE ORAL at 05:46

## 2021-04-07 RX ADMIN — METHADONE HYDROCHLORIDE 80 MG: 40 TABLET ORAL at 13:43

## 2021-04-07 RX ADMIN — FENTANYL CITRATE 75 MCG: 50 INJECTION, SOLUTION INTRAMUSCULAR; INTRAVENOUS at 15:59

## 2021-04-07 RX ADMIN — LIDOCAINE HYDROCHLORIDE: 20 INJECTION, SOLUTION INFILTRATION; PERINEURAL at 18:12

## 2021-04-07 RX ADMIN — NITROGLYCERIN 0.4 MG: 0.4 TABLET, ORALLY DISINTEGRATING SUBLINGUAL at 16:59

## 2021-04-07 RX ADMIN — HYDRALAZINE HYDROCHLORIDE 10 MG: 20 INJECTION INTRAMUSCULAR; INTRAVENOUS at 17:10

## 2021-04-07 RX ADMIN — PRASUGREL 60 MG: 10 TABLET, FILM COATED ORAL at 16:06

## 2021-04-07 RX ADMIN — LIDOCAINE HYDROCHLORIDE: 20 INJECTION, SOLUTION INFILTRATION; PERINEURAL at 15:48

## 2021-04-07 RX ADMIN — SODIUM CHLORIDE: 9 INJECTION, SOLUTION INTRAVENOUS at 20:19

## 2021-04-07 RX ADMIN — NITROGLYCERIN 10 ML: 20 INJECTION INTRAVENOUS at 15:48

## 2021-04-07 RX ADMIN — LEVOTHYROXINE SODIUM 75 MCG: 0.07 TABLET ORAL at 05:46

## 2021-04-07 RX ADMIN — ASPIRIN 81 MG: 81 TABLET, CHEWABLE ORAL at 05:46

## 2021-04-07 RX ADMIN — NITROGLYCERIN 10 ML: 20 INJECTION INTRAVENOUS at 18:12

## 2021-04-07 RX ADMIN — FENTANYL CITRATE 100 MCG: 50 INJECTION, SOLUTION INTRAMUSCULAR; INTRAVENOUS at 18:19

## 2021-04-07 RX ADMIN — HEPARIN SODIUM 2000 UNITS: 1000 INJECTION, SOLUTION INTRAVENOUS; SUBCUTANEOUS at 13:13

## 2021-04-07 RX ADMIN — VERAPAMIL HYDROCHLORIDE 5 MG: 2.5 INJECTION, SOLUTION INTRAVENOUS at 18:12

## 2021-04-07 RX ADMIN — CARVEDILOL 3.12 MG: 3.12 TABLET, FILM COATED ORAL at 20:20

## 2021-04-07 RX ADMIN — HEPARIN SODIUM: 1000 INJECTION, SOLUTION INTRAVENOUS; SUBCUTANEOUS at 16:07

## 2021-04-07 RX ADMIN — HEPARIN SODIUM: 200 INJECTION, SOLUTION INTRAVENOUS at 15:30

## 2021-04-07 RX ADMIN — ACETAMINOPHEN 650 MG: 325 TABLET, FILM COATED ORAL at 16:44

## 2021-04-07 RX ADMIN — INSULIN GLARGINE 30 UNITS: 100 INJECTION, SOLUTION SUBCUTANEOUS at 20:29

## 2021-04-07 RX ADMIN — NITROGLYCERIN 0.4 MG: 0.4 TABLET, ORALLY DISINTEGRATING SUBLINGUAL at 17:18

## 2021-04-07 RX ADMIN — ONDANSETRON: 2 INJECTION INTRAMUSCULAR; INTRAVENOUS at 18:00

## 2021-04-07 RX ADMIN — LISINOPRIL 10 MG: 10 TABLET ORAL at 05:46

## 2021-04-07 RX ADMIN — BIVALIRUDIN 250 MG: 250 INJECTION, POWDER, LYOPHILIZED, FOR SOLUTION INTRAVENOUS at 18:20

## 2021-04-07 RX ADMIN — MORPHINE SULFATE 2 MG: 4 INJECTION INTRAVENOUS at 17:43

## 2021-04-07 RX ADMIN — LABETALOL HYDROCHLORIDE 10 MG: 5 INJECTION, SOLUTION INTRAVENOUS at 17:25

## 2021-04-07 RX ADMIN — CARVEDILOL 3.12 MG: 3.12 TABLET, FILM COATED ORAL at 07:50

## 2021-04-07 RX ADMIN — IOHEXOL 60 ML: 350 INJECTION, SOLUTION INTRAVENOUS at 18:37

## 2021-04-07 RX ADMIN — HEPARIN SODIUM: 1000 INJECTION, SOLUTION INTRAVENOUS; SUBCUTANEOUS at 18:10

## 2021-04-07 RX ADMIN — FLUTICASONE PROPIONATE 44 MCG: 44 AEROSOL, METERED RESPIRATORY (INHALATION) at 08:36

## 2021-04-07 RX ADMIN — VERAPAMIL HYDROCHLORIDE 5 MG: 2.5 INJECTION, SOLUTION INTRAVENOUS at 15:48

## 2021-04-07 RX ADMIN — MIDAZOLAM HYDROCHLORIDE 2 MG: 1 INJECTION, SOLUTION INTRAMUSCULAR; INTRAVENOUS at 18:19

## 2021-04-07 RX ADMIN — HEPARIN SODIUM 14 UNITS/KG/HR: 5000 INJECTION, SOLUTION INTRAVENOUS at 13:12

## 2021-04-07 RX ADMIN — ACETAMINOPHEN 650 MG: 325 TABLET, FILM COATED ORAL at 05:46

## 2021-04-07 RX ADMIN — MORPHINE SULFATE 2 MG: 4 INJECTION INTRAVENOUS at 17:08

## 2021-04-07 RX ADMIN — BIVALIRUDIN 0.2 MG/KG/HR: 250 INJECTION, POWDER, LYOPHILIZED, FOR SOLUTION INTRAVENOUS at 19:30

## 2021-04-07 ASSESSMENT — ENCOUNTER SYMPTOMS
PALPITATIONS: 0
DIZZINESS: 0
LOSS OF CONSCIOUSNESS: 0
SHORTNESS OF BREATH: 1
ABDOMINAL PAIN: 0
NAUSEA: 0
FEVER: 0
FALLS: 0
COUGH: 0
ORTHOPNEA: 0
PND: 0
BACK PAIN: 1
SHORTNESS OF BREATH: 0
HEADACHES: 0
VOMITING: 1
HEARTBURN: 0
DEPRESSION: 0
DIARRHEA: 0
CHILLS: 0

## 2021-04-07 ASSESSMENT — PAIN DESCRIPTION - PAIN TYPE
TYPE: ACUTE PAIN
TYPE: ACUTE PAIN
TYPE: CHRONIC PAIN
TYPE: ACUTE PAIN
TYPE: CHRONIC PAIN
TYPE: CHRONIC PAIN
TYPE: ACUTE PAIN
TYPE: ACUTE PAIN

## 2021-04-07 ASSESSMENT — LIFESTYLE VARIABLES: SUBSTANCE_ABUSE: 0

## 2021-04-07 ASSESSMENT — FIBROSIS 4 INDEX: FIB4 SCORE: 3.14

## 2021-04-07 NOTE — CONSULTS
Cardiology Initial Consultation    Date of Service  4/7/2021    Referring Physician  Shawn Morrow M.D.    Reason for Consultation  Chest pain    History of Presenting Illness  Jessee Martinez is a 60 y.o. male with no prior cardiac history who presented 4/6/2021 with chest pain.  Patient reports being in his usual state of health till about 2 or 3 weeks ago when he tried a new vaping device.  When he first used it he felt burning pain in his substernal region and throat.  Since then he has stopped vaping.  However he has noticed substernal burning/pressure that radiates to his neck and occurs with almost any activity and improves with resting after 1 or 2 hours.  He reports associated dyspnea but no diaphoresis or nausea.  He denies any prior history of similar symptoms.    Review of Systems  Review of Systems   Constitutional: Negative for malaise/fatigue.   Respiratory: Positive for shortness of breath.    Cardiovascular: Positive for chest pain. Negative for palpitations, orthopnea, leg swelling and PND.   Gastrointestinal: Negative for abdominal pain.   Musculoskeletal: Negative for falls.   Neurological: Negative for dizziness and loss of consciousness.   Psychiatric/Behavioral: Negative for depression.   All other systems reviewed and are negative.      Past Medical History   has a past medical history of Diabetes (HCC), Hypertension, and Thyroid disorder (2012).    Surgical History  Reviewed.  Not pertinent.    Family History  Father had MI at the age of 55.    Social History  Patient smoked about half a pack a day for 40 years, quit 1 year ago.  He started vaping after that.  Denies any alcohol or recreational drug use.    Home Medications   Prior to Admission Medications   Prescriptions Last Dose Informant Patient Reported? Taking?   Dulaglutide (TRULICITY) 1.5 MG/0.5ML Solution Pen-injector 3/31/2021 at 0930 Patient Yes Yes   Sig: Inject 0.5 mL under the skin every Wednesday.    Empagliflozin-metFORMIN HCl ER (SYNJARDY XR) 12.5-1000 MG TABLET SR 24 HR 4/6/2021 at 0900 Patient Yes Yes   Sig: Take 2 Tablets by mouth every morning.   Fluticasone-Umeclidin-Vilant (TRELEGY ELLIPTA) 200-62.5-25 MCG/INH AEROSOL POWDER, BREATH ACTIVATED 4/6/2021 at 0900 Patient Yes Yes   Sig: Inhale 1 Puff every day.   insulin glargine (LANTUS) 100 UNIT/ML Solution 4/5/2021 at 2030 Patient Yes Yes   Sig: Inject 50 Units under the skin every evening.   levothyroxine (SYNTHROID) 75 MCG Tab 4/6/2021 at 0800 Patient Yes Yes   Sig: Take 75 mcg by mouth every morning on an empty stomach.   lisinopril (PRINIVIL) 10 MG Tab 4/6/2021 at 0900 Patient Yes Yes   Sig: Take 10 mg by mouth every day.   methadone (DOLOPHINE) 10 MG/ML Conc   Yes Yes   Sig: Take 80 mg by mouth every day.      Facility-Administered Medications: None       Inpatient Medications  • senna-docusate  2 tablet BID    And   • polyethylene glycol/lytes  1 Packet QDAY PRN    And   • magnesium hydroxide  30 mL QDAY PRN    And   • bisacodyl  10 mg QDAY PRN   • acetaminophen  650 mg Q6HRS PRN   • labetalol  10 mg Q4HRS PRN   • insulin regular  1-6 Units 4X/DAY ACHS    And   • glucose  16 g Q15 MIN PRN    And   • dextrose 50%  50 mL Q15 MIN PRN   • aspirin  81 mg DAILY   • atorvastatin  40 mg Q EVENING   • levothyroxine  75 mcg AM ES   • carvedilol  3.125 mg BID WITH MEALS   • nitroglycerin  0.4 mg Q5 MIN PRN   • omeprazole  20 mg DAILY   • lisinopril  10 mg DAILY   • insulin glargine  30 Units Q EVENING   • umeclidinium-vilanterol  1 Puff QDAILY (RT)    And   • fluticasone  1 Puff QDAILY (RT)   • heparin  0-30 Units/kg/hr (Adjusted) Continuous   • heparin  2,000 Units PRN   • morphine injection  2 mg Q4HRS PRN       Allergies  No Known Allergies    Vital signs in last 24 hours  Temp:  [36.2 °C (97.1 °F)-36.3 °C (97.4 °F)] 36.3 °C (97.4 °F)  Pulse:  [62-86] 71  Resp:  [16-20] 16  BP: (100-178)/(58-96) 113/65  SpO2:  [89 %-98 %] 95 %    Physical Exam  Physical  Exam   Constitutional: He is oriented to person, place, and time and well-developed, well-nourished, and in no distress. No distress.   HENT:   Head: Normocephalic and atraumatic.   Eyes: Conjunctivae are normal. No scleral icterus.   Neck: No JVD present.   Cardiovascular: Normal rate, regular rhythm and intact distal pulses. Exam reveals no gallop and no friction rub.   No murmur heard.  Pulmonary/Chest: Effort normal and breath sounds normal. No respiratory distress. He has no wheezes. He has no rales. He exhibits no tenderness.   Abdominal: Soft. Bowel sounds are normal. He exhibits no distension. There is no abdominal tenderness.   Musculoskeletal:         General: No edema. Normal range of motion.      Cervical back: Normal range of motion and neck supple.   Neurological: He is alert and oriented to person, place, and time.   Skin: Skin is warm and dry. No rash noted. He is not diaphoretic.   Psychiatric: Mood, memory, affect and judgment normal.   Nursing note and vitals reviewed.      Lab Review  Recent Labs     04/06/21  1235 04/07/21  0009   WBC 6.2 8.0   RBC 5.23 4.96   HEMOGLOBIN 15.4 14.7   HEMATOCRIT 45.1 42.3   PLATELETCT 157* 153*   MCV 86.2 85.3   MPV 11.2 11.2     Recent Labs     04/06/21  1235 04/07/21  0009   SODIUM 136 137   POTASSIUM 4.4 4.0   CHLORIDE 103 102   CO2 25 26   GLUCOSE 175* 129*   BUN 14 12   CREATININE 0.75 0.79      Lab Results   Component Value Date/Time    TROPONINT 327 (H) 04/07/2021 05:51 AM       Lab Results   Component Value Date/Time    ASTSGOT 52 (H) 04/07/2021 12:09 AM    ALTSGPT 45 04/07/2021 12:09 AM     Lab Results   Component Value Date/Time    CHOLSTRLTOT 117 04/07/2021 12:09 AM    LDL 56 04/07/2021 12:09 AM    HDL 33 (A) 04/07/2021 12:09 AM    TRIGLYCERIDE 139 04/07/2021 12:09 AM         Labs reviewed as noted above.  Normal renal function.    Cardiac Imaging and Procedures Review  EKG performed yesterday at 4:13 PM was personally reviewed and per my interpretation  shows sinus rhythm with nonspecific intraventricular delay.    Echocardiogram performed today was personally reviewed and per my interpretation shows preserved LV systolic function.    Assessment/Plan    NSTEMI:  Chest pain:  Hypertension:    Patient with new onset chest discomfort concerning for angina.  Elevated troponins.  NSTEMI likely type I.  He continues to have mild chest pressure.  He will be referred urgently for coronary angiogram today for further evaluation.  He has been n.p.o.  Continue heparin drip for now.  Continue aspirin and Lipitor.  Hypertension is well controlled.  Continue carvedilol and lisinopril.      Thank you for allowing me to participate in the care of this patient. Please do not hesitate to contact me with any questions.    Rachel Bryant MD, [facility]  Cardiologist  [facility]      ADDENDUM at 5:51PM:  Received page from bedside nurse around 5pm.  Patient returned from the cardiac catheterization lab and about 10 minutes later started having severe chest pain.  He was hypotensive with systolics in the 180s.  Hydralazine 10 mg IV x1 and morphine were given along with nitroglycerin.  Nitroglycerin helped improve his symptoms but he continued to complain of 9 out of 10 chest pain similar to his presentation symptoms.  His blood pressures improved to 150s systolic and was given labetalol again without any relief in his symptoms.  EKG personally reviewed and did not show any ST changes, however he had an OM stent and this territory could be electrically silent. Discussed case with Dr. Logan.  Patient was asymptomatic in the cardiac catheterization lab at the time of this PCI.  Due to his new onset symptoms that have failed to resolve with medications despite improvement in his BP, we will taken back to the cardiac catheterization lab emergently for reevaluation.    40 minutes of critical care time was spent managing the patient for his chest pain and  coordinating care as detailed above.  Almost all of this time was spent directly at bedside.  This time does not overlap with any other physicians or with billable procedures.    Rachel Bryant MD, [facility]  Cardiologist  [facility]

## 2021-04-07 NOTE — PROGRESS NOTES
Hospital Medicine Daily Progress Note    Date of Service  4/7/2021    Chief Complaint  60 y.o. male admitted 4/6/2021 with chest pain     Hospital Course  68-year-old male past medical history of type 2 diabetes, hypertension, tobacco use, hypothyroidism presented complaining of chest pain.  On arrival troponin is 81->241-->327, EKG new onset of LBBB, CTA no evidence of PE, pulmonic, pneumothorax. Pt was started on heparin, cardiology consulted.       Interval Problem Update  Chest pain improved significantly. Methadone restarted since pt has been on it for long time. Possible cath later     Consultants/Specialty  Cardiology     Code Status  Full Code    Disposition  Inpatient     Review of Systems  Review of Systems   Constitutional: Negative for chills, fever and malaise/fatigue.   Respiratory: Negative for cough and shortness of breath.    Cardiovascular: Positive for chest pain. Negative for palpitations and leg swelling.   Gastrointestinal: Positive for vomiting. Negative for abdominal pain, diarrhea, heartburn and nausea.   Genitourinary: Negative for dysuria.   Musculoskeletal: Positive for back pain.   Neurological: Negative for dizziness and headaches.   Psychiatric/Behavioral: Negative for substance abuse.        Physical Exam  Temp:  [36.2 °C (97.1 °F)-36.5 °C (97.7 °F)] 36.5 °C (97.7 °F)  Pulse:  [65-77] 77  Resp:  [16-18] 17  BP: (100-137)/(58-83) 111/71  SpO2:  [95 %-98 %] 96 %    Physical Exam  Vitals and nursing note reviewed.   Constitutional:       Appearance: Normal appearance. He is obese. He is not ill-appearing.   HENT:      Head: Normocephalic and atraumatic.   Eyes:      General: No scleral icterus.  Cardiovascular:      Rate and Rhythm: Normal rate.      Heart sounds: No murmur.   Pulmonary:      Effort: Pulmonary effort is normal. No respiratory distress.      Breath sounds: No wheezing.   Abdominal:      General: There is distension.      Palpations: Abdomen is soft.      Tenderness: There  is no abdominal tenderness.   Musculoskeletal:         General: No swelling.   Skin:     Coloration: Skin is not jaundiced.   Neurological:      Mental Status: He is alert and oriented to person, place, and time.      Cranial Nerves: No cranial nerve deficit.   Psychiatric:         Mood and Affect: Mood normal.         Behavior: Behavior normal.         Thought Content: Thought content normal.         Judgment: Judgment normal.         Fluids    Intake/Output Summary (Last 24 hours) at 4/7/2021 1525  Last data filed at 4/7/2021 1200  Gross per 24 hour   Intake 0 ml   Output --   Net 0 ml       Laboratory  Recent Labs     04/06/21  1235 04/07/21  0009   WBC 6.2 8.0   RBC 5.23 4.96   HEMOGLOBIN 15.4 14.7   HEMATOCRIT 45.1 42.3   MCV 86.2 85.3   MCH 29.4 29.6   MCHC 34.1 34.8   RDW 41.3 41.2   PLATELETCT 157* 153*   MPV 11.2 11.2     Recent Labs     04/06/21  1235 04/07/21  0009   SODIUM 136 137   POTASSIUM 4.4 4.0   CHLORIDE 103 102   CO2 25 26   GLUCOSE 175* 129*   BUN 14 12   CREATININE 0.75 0.79   CALCIUM 8.9 8.7     Recent Labs     04/06/21  1626   APTT 34.4   INR 0.96         Recent Labs     04/07/21  0009   TRIGLYCERIDE 139   HDL 33*   LDL 56       Imaging  EC-ECHOCARDIOGRAM COMPLETE W/O CONT   Final Result      CT-CTA CHEST PULMONARY ARTERY W/ RECONS   Final Result      1.  No CT evidence for pulmonary emboli.   2.  No pneumonia or pneumothorax.            DX-CHEST-PORTABLE (1 VIEW)   Final Result      Hypoinflation with minimal LEFT lung base atelectasis.      CL-LEFT HEART CATHETERIZATION WITH POSSIBLE INTERVENTION    (Results Pending)        Assessment/Plan  NSTEMI (non-ST elevated myocardial infarction) (HCC)- (present on admission)  Assessment & Plan  Patient with worsening chest pain, EKG with new LBBB, tropes 81  Heart score 6  Status post loading aspirin 324, continue with aspirin 81 mg p.o.  Started on Heparin GTT  Started statin, beta-blocker, lisinopril 10 mg  Echocardiogram unremarkable. EF 60 %    Follow-up cardiology consult  Possible cath today     Tobacco abuse- (present on admission)  Assessment & Plan  Patient recently switched from tobacco to vaping  Counseled patient on abstinence for 10 minutes      Acquired hypothyroidism- (present on admission)  Assessment & Plan  Continue with Synthroid 75 mcg p.o. q. 6 AM  Follow-up TSH    Essential hypertension- (present on admission)  Assessment & Plan  Continue with lisinopril 10 mg p.o. once daily  Start on Coreg 3.125 milligrams twice daily      Type 2 diabetes mellitus without complication, with long-term current use of insulin (HCC)- (present on admission)  Assessment & Plan  Continue with the Lantus 30 daily, patient on 50 units at home  Continue with sliding scale insulin and hypoglycemia protocol  Titrate Lantus as tolerated  Last A1c 1 month ago 7.1  hold oral hypoglycemic agents           VTE prophylaxis: heparin

## 2021-04-07 NOTE — PROCEDURES
"CARDIAC CATHETERIZATION REPORT    Referring Provider: Rachel Bryant M.D.    PROCEDURE PHYSICIAN: Pavan Logan MD, [facility]  ASSISTANT: None    IMPRESSIONS:  1. NSTEMI with obstructive one vessel CAD  2. Successful PCI of the circumflex into OM1 using one MISHA, IVUS guidance  3. Mild elevation in LVEDP    Recommendations:  Dual antiplatelet therapy for 12 months    Pre-procedure diagnosis: NSTEMI  Post-procedure diagnosis: Same    Procedure performed  Selective coronary angiography  Left heart catheterization  Percutaneous coronary intervention - Stent Placement (Cx-OM)  Intravascular Ultrasound (Cx-OM)    Conscious sedation was supervised by myself and administered by trained personnel using fentanyl and versed between 1540 and 1600. The patient tolerated sedation without complication.     Procedure Description  1. Access: 5/6 [place] right radial artery Micropuncture technique was utilized following local anesthesia with lidocaine.  A radial cocktail containing verapamil, heparin and saline was administered in the radial artery sheath    2. Diagnostic description: The catheter was passed to the central circulation with the aide of J tipped 0.35\" wire. A 5F TIG 4.0 was used to inject the coronary circulation enter the left ventricle during invasive hemodynamic monitoring.     3. Description of Intervention: After confirming therapeutic anticoagulation intervention proceeded. A 6F EBU 3.0 guiding catheter was used. The lesion in the circumflex was crossed with a 0.014\" BMW wire.  The lesion was predilated with a 2.5 x 15 compliant balloon and subsequently a 2.5 x 15 Kamran MISHA was deployed at 18 [place].  IVUS was performed which demonstrated 1-1 stent sizing both proximally and distally, there was heavy plaque burden at the proximal stent edge and extending to the very proximal portion of the vessel but angiographically the result was excellent.    4. Closing: At completion of the procedure the relevant equipment was " removed from the body and hemostasis achieved by Radial band    Findings   Hemodynamics:   Aorta: 103/66 mmHg  LV: 103/17 mmHg    Coronary Anatomy   Left Main: Normal   LAD: Minimal luminal irregularities   LCx: 20% stenosis at the ostium.  The OM1 has a proximal 90% stenosis   RCA: Dominant, Diffuse luminal irregularities throughout the AV groove with no more than 30% stenosis.  The PDA and posterior lateral branches are normal     Results of intervention to the circumflex:  Pre: 90% stenosis and ALYSE III flow  Post: 0% residual stenosis and ALYSE III flow. No dissection or distal embolization.    Technical Factors  1. Complications: None  2. Estimated Blood Loss: <50 cc  3. Specimens: None  4. Contrast Volume: 52 ml  5. Medications: Radial cocktail (Verapamil 2.5 mg, Nitroglycerin 100 mcg) Heparin to maintain ACT >250 Prasugrel 60 mg Intracoronary nitroglycerin  6. Radiation (Air Kerma): 378 mGy

## 2021-04-07 NOTE — CARE PLAN
Problem: Communication  Goal: The ability to communicate needs accurately and effectively will improve  Outcome: PROGRESSING AS EXPECTED     Problem: Safety  Goal: Will remain free from injury  Outcome: PROGRESSING AS EXPECTED  Goal: Will remain free from falls  Outcome: PROGRESSING AS EXPECTED     Problem: Venous Thromboembolism (VTW)/Deep Vein Thrombosis (DVT) Prevention:  Goal: Patient will participate in Venous Thrombosis (VTE)/Deep Vein Thrombosis (DVT)Prevention Measures  Outcome: PROGRESSING AS EXPECTED  Note: Patient is on a heparin drip.     Problem: Knowledge Deficit  Goal: Knowledge of disease process/condition, treatment plan, diagnostic tests, and medications will improve  Outcome: PROGRESSING AS EXPECTED  Goal: Knowledge of the prescribed therapeutic regimen will improve  Outcome: PROGRESSING AS EXPECTED

## 2021-04-07 NOTE — PROGRESS NOTES
Bedside report received from night shift RN. Pt alert sitting up in bed. Pt complaining of chronic lower and upper back pain. Pt reports he has taken 80mg of methadone daily for the last 11 years and he gets it from [facility]. Med added to home med rec and Dr Morrow was notified. Pt pulled off O2 and when spot checked was 85% on RA. Pt placed back on 2 L/min via NC. Pt placed on . Bed in low and locked position, call button within reach and fall precautions are in place.  Pt remains NPO for possible heart cath

## 2021-04-07 NOTE — HOSPITAL COURSE
68-year-old male past medical history of type 2 diabetes, hypertension, tobacco use, hypothyroidism presented complaining of chest pain.  On arrival troponin is 81->241-->327, EKG new onset of LBBB, CTA no evidence of PE, pulmonic, pneumothorax. Pt was started on heparin, cardiology consulted. He did have cardiac cath 4/7-stent placement IMSHA to OM1. Couple of hours later pt started to have severe chest pain 10/10. Cardiology paged. He was taken to cath again and noted that stent was restenosis. Meds adjusted. Echo pending

## 2021-04-07 NOTE — PROGRESS NOTES
Assumed care this pm from day shift RN. Patient sitting up in bed with no signs of distres. Patient AxO 4, O2 @ 2 L thru nasal cannula, VSS. Call bell and personal items within reach, bed locked in low position. Bed exit alarm on. Hourly rounding in place. Tele box in place, monitor room notified.

## 2021-04-07 NOTE — CONSULTS
Cardiology consulted for NSTEMI and chest pain. New onset symptoms 2-3 weeks ago. Plan for [facility] today.   Formal note to follow.     Rachel Bryant MD, [facility]  Cardiologist  [facility]

## 2021-04-08 ENCOUNTER — APPOINTMENT (OUTPATIENT)
Dept: CARDIOLOGY | Facility: MEDICAL CENTER | Age: 61
DRG: 247 | End: 2021-04-08
Attending: INTERNAL MEDICINE
Payer: MEDICARE

## 2021-04-08 LAB
ACT BLD: 290 SEC (ref 74–137)
ALBUMIN SERPL BCP-MCNC: 3.7 G/DL (ref 3.2–4.9)
ALBUMIN/GLOB SERPL: 1.4 G/DL
ALP SERPL-CCNC: 66 U/L (ref 30–99)
ALT SERPL-CCNC: 50 U/L (ref 2–50)
ANION GAP SERPL CALC-SCNC: 8 MMOL/L (ref 7–16)
AST SERPL-CCNC: 137 U/L (ref 12–45)
BASOPHILS # BLD AUTO: 0.5 % (ref 0–1.8)
BASOPHILS # BLD: 0.04 K/UL (ref 0–0.12)
BILIRUB SERPL-MCNC: 0.3 MG/DL (ref 0.1–1.5)
BUN SERPL-MCNC: 11 MG/DL (ref 8–22)
CALCIUM SERPL-MCNC: 8.2 MG/DL (ref 8.5–10.5)
CHLORIDE SERPL-SCNC: 108 MMOL/L (ref 96–112)
CO2 SERPL-SCNC: 25 MMOL/L (ref 20–33)
CREAT SERPL-MCNC: 0.74 MG/DL (ref 0.5–1.4)
EOSINOPHIL # BLD AUTO: 0.14 K/UL (ref 0–0.51)
EOSINOPHIL NFR BLD: 1.8 % (ref 0–6.9)
ERYTHROCYTE [DISTWIDTH] IN BLOOD BY AUTOMATED COUNT: 41.4 FL (ref 35.9–50)
GLOBULIN SER CALC-MCNC: 2.6 G/DL (ref 1.9–3.5)
GLUCOSE BLD-MCNC: 120 MG/DL (ref 65–99)
GLUCOSE BLD-MCNC: 129 MG/DL (ref 65–99)
GLUCOSE BLD-MCNC: 193 MG/DL (ref 65–99)
GLUCOSE SERPL-MCNC: 119 MG/DL (ref 65–99)
HCT VFR BLD AUTO: 41.1 % (ref 42–52)
HGB BLD-MCNC: 13.9 G/DL (ref 14–18)
IMM GRANULOCYTES # BLD AUTO: 0.04 K/UL (ref 0–0.11)
IMM GRANULOCYTES NFR BLD AUTO: 0.5 % (ref 0–0.9)
LV EJECT FRACT  99904: 65
LV EJECT FRACT MOD 2C 99903: 61.35
LV EJECT FRACT MOD 4C 99902: 63.31
LV EJECT FRACT MOD BP 99901: 63.11
LYMPHOCYTES # BLD AUTO: 2.03 K/UL (ref 1–4.8)
LYMPHOCYTES NFR BLD: 26 % (ref 22–41)
MCH RBC QN AUTO: 29.3 PG (ref 27–33)
MCHC RBC AUTO-ENTMCNC: 33.8 G/DL (ref 33.7–35.3)
MCV RBC AUTO: 86.7 FL (ref 81.4–97.8)
MONOCYTES # BLD AUTO: 0.55 K/UL (ref 0–0.85)
MONOCYTES NFR BLD AUTO: 7 % (ref 0–13.4)
NEUTROPHILS # BLD AUTO: 5.02 K/UL (ref 1.82–7.42)
NEUTROPHILS NFR BLD: 64.2 % (ref 44–72)
NRBC # BLD AUTO: 0 K/UL
NRBC BLD-RTO: 0 /100 WBC
PLATELET # BLD AUTO: 145 K/UL (ref 164–446)
PMV BLD AUTO: 10.8 FL (ref 9–12.9)
POTASSIUM SERPL-SCNC: 3.9 MMOL/L (ref 3.6–5.5)
PROT SERPL-MCNC: 6.3 G/DL (ref 6–8.2)
RBC # BLD AUTO: 4.74 M/UL (ref 4.7–6.1)
SODIUM SERPL-SCNC: 141 MMOL/L (ref 135–145)
WBC # BLD AUTO: 7.8 K/UL (ref 4.8–10.8)

## 2021-04-08 PROCEDURE — 700105 HCHG RX REV CODE 258: Performed by: INTERNAL MEDICINE

## 2021-04-08 PROCEDURE — 94640 AIRWAY INHALATION TREATMENT: CPT

## 2021-04-08 PROCEDURE — 700102 HCHG RX REV CODE 250 W/ 637 OVERRIDE(OP): Performed by: INTERNAL MEDICINE

## 2021-04-08 PROCEDURE — 85025 COMPLETE CBC W/AUTO DIFF WBC: CPT

## 2021-04-08 PROCEDURE — 93308 TTE F-UP OR LMTD: CPT

## 2021-04-08 PROCEDURE — A9270 NON-COVERED ITEM OR SERVICE: HCPCS | Performed by: INTERNAL MEDICINE

## 2021-04-08 PROCEDURE — 99233 SBSQ HOSP IP/OBS HIGH 50: CPT | Performed by: INTERNAL MEDICINE

## 2021-04-08 PROCEDURE — A9270 NON-COVERED ITEM OR SERVICE: HCPCS | Performed by: STUDENT IN AN ORGANIZED HEALTH CARE EDUCATION/TRAINING PROGRAM

## 2021-04-08 PROCEDURE — 82962 GLUCOSE BLOOD TEST: CPT

## 2021-04-08 PROCEDURE — 700102 HCHG RX REV CODE 250 W/ 637 OVERRIDE(OP): Performed by: STUDENT IN AN ORGANIZED HEALTH CARE EDUCATION/TRAINING PROGRAM

## 2021-04-08 PROCEDURE — 770020 HCHG ROOM/CARE - TELE (206)

## 2021-04-08 PROCEDURE — 94760 N-INVAS EAR/PLS OXIMETRY 1: CPT

## 2021-04-08 PROCEDURE — 93308 TTE F-UP OR LMTD: CPT | Mod: 26 | Performed by: INTERNAL MEDICINE

## 2021-04-08 PROCEDURE — 36415 COLL VENOUS BLD VENIPUNCTURE: CPT

## 2021-04-08 PROCEDURE — 80053 COMPREHEN METABOLIC PANEL: CPT

## 2021-04-08 PROCEDURE — 71045 X-RAY EXAM CHEST 1 VIEW: CPT

## 2021-04-08 PROCEDURE — 700101 HCHG RX REV CODE 250: Performed by: STUDENT IN AN ORGANIZED HEALTH CARE EDUCATION/TRAINING PROGRAM

## 2021-04-08 RX ORDER — OXYCODONE HYDROCHLORIDE 5 MG/1
5 TABLET ORAL EVERY 6 HOURS PRN
Status: DISCONTINUED | OUTPATIENT
Start: 2021-04-08 | End: 2021-04-09 | Stop reason: HOSPADM

## 2021-04-08 RX ADMIN — UMECLIDINIUM BROMIDE AND VILANTEROL TRIFENATATE 1 PUFF: 62.5; 25 POWDER RESPIRATORY (INHALATION) at 07:24

## 2021-04-08 RX ADMIN — FLUTICASONE PROPIONATE 44 MCG: 44 AEROSOL, METERED RESPIRATORY (INHALATION) at 07:24

## 2021-04-08 RX ADMIN — INSULIN GLARGINE 30 UNITS: 100 INJECTION, SOLUTION SUBCUTANEOUS at 17:26

## 2021-04-08 RX ADMIN — ACETAMINOPHEN 650 MG: 325 TABLET, FILM COATED ORAL at 05:55

## 2021-04-08 RX ADMIN — LEVOTHYROXINE SODIUM 75 MCG: 0.07 TABLET ORAL at 05:47

## 2021-04-08 RX ADMIN — SODIUM CHLORIDE: 9 INJECTION, SOLUTION INTRAVENOUS at 03:00

## 2021-04-08 RX ADMIN — METHADONE HYDROCHLORIDE 80 MG: 40 TABLET ORAL at 14:23

## 2021-04-08 RX ADMIN — IPRATROPIUM BROMIDE AND ALBUTEROL SULFATE 3 ML: .5; 2.5 SOLUTION RESPIRATORY (INHALATION) at 00:01

## 2021-04-08 RX ADMIN — LISINOPRIL 10 MG: 10 TABLET ORAL at 05:47

## 2021-04-08 RX ADMIN — CARVEDILOL 3.12 MG: 3.12 TABLET, FILM COATED ORAL at 08:07

## 2021-04-08 RX ADMIN — ASPIRIN 81 MG: 81 TABLET, COATED ORAL at 08:07

## 2021-04-08 RX ADMIN — OMEPRAZOLE 20 MG: 20 CAPSULE, DELAYED RELEASE ORAL at 05:47

## 2021-04-08 RX ADMIN — ACETAMINOPHEN 650 MG: 325 TABLET, FILM COATED ORAL at 08:11

## 2021-04-08 RX ADMIN — CARVEDILOL 3.12 MG: 3.12 TABLET, FILM COATED ORAL at 17:20

## 2021-04-08 RX ADMIN — ATORVASTATIN CALCIUM 40 MG: 40 TABLET, FILM COATED ORAL at 17:20

## 2021-04-08 RX ADMIN — PRASUGREL 10 MG: 10 TABLET, FILM COATED ORAL at 05:47

## 2021-04-08 ASSESSMENT — ENCOUNTER SYMPTOMS
CHILLS: 0
SHORTNESS OF BREATH: 0
VOMITING: 0
NAUSEA: 0
WHEEZING: 0
FEVER: 0
HEARTBURN: 0
CHEST TIGHTNESS: 0
DIARRHEA: 0
VOMITING: 1
ABDOMINAL PAIN: 0
HEADACHES: 0
COUGH: 0
PALPITATIONS: 0
BACK PAIN: 1
DIZZINESS: 0

## 2021-04-08 ASSESSMENT — FIBROSIS 4 INDEX: FIB4 SCORE: 8.28

## 2021-04-08 ASSESSMENT — PAIN DESCRIPTION - PAIN TYPE
TYPE: ACUTE PAIN
TYPE: ACUTE PAIN
TYPE: CHRONIC PAIN
TYPE: ACUTE PAIN

## 2021-04-08 ASSESSMENT — LIFESTYLE VARIABLES: SUBSTANCE_ABUSE: 0

## 2021-04-08 NOTE — PROGRESS NOTES
Hospital Medicine Daily Progress Note    Date of Service  4/8/2021    Chief Complaint  60 y.o. male admitted 4/6/2021 with chest pain     Hospital Course  68-year-old male past medical history of type 2 diabetes, hypertension, tobacco use, hypothyroidism presented complaining of chest pain.  On arrival troponin is 81->241-->327, EKG new onset of LBBB, CTA no evidence of PE, pulmonic, pneumothorax. Pt was started on heparin, cardiology consulted. He did have cardiac cath 4/7-stent placement MISHA to OM1. Couple of hours later pt started to have severe chest pain 10/10. Cardiology paged. He was taken to cath again and noted that stent was restenosis. Meds adjusted. Echo pending       Interval Problem Update  Chest pain improved significantly. Methadone restarted since pt has been on it for long time. Possible cath later   4/8- pt was taken to cath two times yesterday. He had succesfully MISHA placement on OM1. After two hours pt did have severe chest pain, taken to cath again, stent occluded. Another stent was replaced. Pt feeling great today. No chest pain. He has chronic pain which is under control. No other concerns. Remain one more night observation     Consultants/Specialty  Cardiology     Code Status  Full Code    Disposition  Inpatient     Review of Systems  Review of Systems   Constitutional: Negative for chills, fever and malaise/fatigue.   Respiratory: Negative for cough and shortness of breath.    Cardiovascular: Positive for chest pain. Negative for palpitations and leg swelling.   Gastrointestinal: Positive for vomiting. Negative for abdominal pain, diarrhea, heartburn and nausea.   Genitourinary: Negative for dysuria.   Musculoskeletal: Positive for back pain.   Neurological: Negative for dizziness and headaches.   Psychiatric/Behavioral: Negative for substance abuse.        Physical Exam  Temp:  [36.4 °C (97.5 °F)-36.9 °C (98.4 °F)] 36.9 °C (98.4 °F)  Pulse:  [60-94] 64  Resp:  [15-18] 18  BP:  (102-184)/() 102/67  SpO2:  [93 %-99 %] 95 %    Physical Exam  Vitals and nursing note reviewed.   Constitutional:       Appearance: Normal appearance. He is obese. He is not ill-appearing.   HENT:      Head: Normocephalic and atraumatic.   Eyes:      General: No scleral icterus.  Cardiovascular:      Rate and Rhythm: Normal rate.      Heart sounds: No murmur.   Pulmonary:      Effort: Pulmonary effort is normal. No respiratory distress.      Breath sounds: No wheezing.   Abdominal:      General: There is distension.      Palpations: Abdomen is soft.      Tenderness: There is no abdominal tenderness.   Musculoskeletal:         General: No swelling.   Skin:     Coloration: Skin is not jaundiced.   Neurological:      Mental Status: He is alert and oriented to person, place, and time.      Cranial Nerves: No cranial nerve deficit.   Psychiatric:         Mood and Affect: Mood normal.         Behavior: Behavior normal.         Thought Content: Thought content normal.         Judgment: Judgment normal.         Fluids    Intake/Output Summary (Last 24 hours) at 4/8/2021 1423  Last data filed at 4/8/2021 0708  Gross per 24 hour   Intake --   Output 1625 ml   Net -1625 ml       Laboratory  Recent Labs     04/06/21  1235 04/07/21  0009 04/08/21  0523   WBC 6.2 8.0 7.8   RBC 5.23 4.96 4.74   HEMOGLOBIN 15.4 14.7 13.9*   HEMATOCRIT 45.1 42.3 41.1*   MCV 86.2 85.3 86.7   MCH 29.4 29.6 29.3   MCHC 34.1 34.8 33.8   RDW 41.3 41.2 41.4   PLATELETCT 157* 153* 145*   MPV 11.2 11.2 10.8     Recent Labs     04/06/21  1235 04/07/21  0009 04/08/21  0523   SODIUM 136 137 141   POTASSIUM 4.4 4.0 3.9   CHLORIDE 103 102 108   CO2 25 26 25   GLUCOSE 175* 129* 119*   BUN 14 12 11   CREATININE 0.75 0.79 0.74   CALCIUM 8.9 8.7 8.2*     Recent Labs     04/06/21  1626   APTT 34.4   INR 0.96         Recent Labs     04/07/21  0009   TRIGLYCERIDE 139   HDL 33*   LDL 56       Imaging  DX-CHEST-PORTABLE (1 VIEW)   Final Result         1.  Slight  hazy opacity at the left lung base suggests possible infiltrate.   2.  Cardiomegaly      EC-ECHOCARDIOGRAM COMPLETE W/O CONT   Final Result      CT-CTA CHEST PULMONARY ARTERY W/ RECONS   Final Result      1.  No CT evidence for pulmonary emboli.   2.  No pneumonia or pneumothorax.            DX-CHEST-PORTABLE (1 VIEW)   Final Result      Hypoinflation with minimal LEFT lung base atelectasis.      CL-LEFT HEART CATHETERIZATION WITH POSSIBLE INTERVENTION    (Results Pending)   CL-LEFT HEART CATHETERIZATION WITH POSSIBLE INTERVENTION    (Results Pending)   EC-ECHOCARDIOGRAM LTD W/O CONT    (Results Pending)        Assessment/Plan  NSTEMI (non-ST elevated myocardial infarction) (HCC)- (present on admission)  Assessment & Plan  Patient with worsening chest pain, EKG with new LBBB, tropes 81  Heart score 6  Started statin, beta-blocker, lisinopril 10 mg  Echocardiogram unremarkable. EF 60 %   Cath x2 04/07. MISHA on OM1  meds adjusted per cardiology  Echo repeat 4/8-continue to monitor     Tobacco abuse- (present on admission)  Assessment & Plan  Patient recently switched from tobacco to vaping  Counseled patient on abstinence for 10 minutes      Acquired hypothyroidism- (present on admission)  Assessment & Plan  Continue with Synthroid 75 mcg p.o. q. 6 AM  Follow-up TSH    Essential hypertension- (present on admission)  Assessment & Plan  Continue with lisinopril 10 mg p.o. once daily  Start on Coreg 3.125 milligrams twice daily      Type 2 diabetes mellitus without complication, with long-term current use of insulin ([facility])- (present on admission)  Assessment & Plan  Continue with the Lantus 30 daily, patient on 50 units at home  Continue with sliding scale insulin and hypoglycemia protocol  Titrate Lantus as tolerated  Last A1c 1 month ago 7.1  hold oral hypoglycemic agents           VTE prophylaxis: heparin

## 2021-04-08 NOTE — PROGRESS NOTES
Cardiology Follow Up Progress Note    Date of Service  4/8/2021    Attending Physician  Shawn Morrow M.D.    Chief Complaint   Chest Pain    HPI  Jessee Martinez is a 60 y.o. male admitted 4/6/2021 with chest pain no previous cardiac history.  Patient had burning pain in the substernal region and throat.  It was associated with vaping and he has since stopped.    Patient was taken for cardiac catheterization 4/7/2021 in which a 2.5 x 15 mm Kamran MISHA was placed to the OM1.  Patient was taken back to his room and started having severe chest pain, so was taken back to the Cath Lab with in-stent restenosis.  A 3.0 x 30 mm Kamran MISHA was placed in the same region of the circumflex-OM1.    Interim Events  4/8/2021: SR 60-90s  Chest pain-free  Right breast and right groin sites CDI, no hematoma, no ecchymosis      Review of Systems  Review of Systems   Constitutional: Negative for chills and fever.   Respiratory: Negative for cough, chest tightness, shortness of breath and wheezing.    Cardiovascular: Negative for chest pain, palpitations and leg swelling.   Gastrointestinal: Negative for nausea and vomiting.   Neurological: Negative for dizziness and headaches.   All other systems reviewed and are negative.      Vital signs in last 24 hours  Temp:  [36.4 °C (97.5 °F)-36.8 °C (98.3 °F)] 36.4 °C (97.5 °F)  Pulse:  [60-94] 75  Resp:  [15-18] 18  BP: (110-184)/() 132/78  SpO2:  [93 %-99 %] 97 %    Physical Exam  Physical Exam  Vitals and nursing note reviewed.   Constitutional:       Appearance: Normal appearance.   HENT:      Head: Normocephalic and atraumatic.      Mouth/Throat:      Mouth: Mucous membranes are moist.   Eyes:      Extraocular Movements: Extraocular movements intact.   Neck:      Comments: No JVD  Cardiovascular:      Rate and Rhythm: Normal rate and regular rhythm.      Pulses: Normal pulses.      Heart sounds: Normal heart sounds. No murmur.   Pulmonary:      Effort: Pulmonary effort is normal.       Breath sounds: Normal breath sounds.   Abdominal:      Palpations: Abdomen is soft.   Musculoskeletal:      Cervical back: Normal range of motion and neck supple.   Skin:     General: Skin is warm and dry.   Neurological:      General: No focal deficit present.      Mental Status: He is alert and oriented to person, place, and time.   Psychiatric:         Mood and Affect: Mood normal.         Behavior: Behavior normal.         Lab Review  Lab Results   Component Value Date/Time    WBC 7.8 2021 05:23 AM    RBC 4.74 2021 05:23 AM    HEMOGLOBIN 13.9 (L) 2021 05:23 AM    HEMATOCRIT 41.1 (L) 2021 05:23 AM    MCV 86.7 2021 05:23 AM    MCH 29.3 2021 05:23 AM    MCHC 33.8 2021 05:23 AM    MPV 10.8 2021 05:23 AM      Lab Results   Component Value Date/Time    SODIUM 141 2021 05:23 AM    POTASSIUM 3.9 2021 05:23 AM    CHLORIDE 108 2021 05:23 AM    CO2 25 2021 05:23 AM    GLUCOSE 119 (H) 2021 05:23 AM    BUN 11 2021 05:23 AM    CREATININE 0.74 2021 05:23 AM      Lab Results   Component Value Date/Time    ASTSGOT 137 (H) 2021 05:23 AM    ALTSGPT 50 2021 05:23 AM     Lab Results   Component Value Date/Time    CHOLSTRLTOT 117 2021 12:09 AM    LDL 56 2021 12:09 AM    HDL 33 (A) 2021 12:09 AM    TRIGLYCERIDE 139 2021 12:09 AM    TROPONINT 327 (H) 2021 05:51 AM       No results for input(s): NTPROBNP in the last 72 hours.    Cardiac Imaging and Procedures Review  EK2021  SINUS RHYTHM   FIRST DEGREE AV BLOCK   BORDERLINE IVCD WITH LAD   BORDERLINE LOW VOLTAGE IN FRONTAL LEADS   Poor R wave progression   Electronically Signed On 2021 21:56:38 PDT by Eyad Winslow MD    Echocardiogram: 2021  CONCLUSIONS  No prior study is available for comparison.   Normal left ventricular systolic function.  Left ventricular ejection fraction is visually estimated to be 60%.  No significant valve  abnormalities.     Cardiac Catheterization: 4/7/2021  IMPRESSIONS:  1. NSTEMI with obstructive one vessel CAD  2. Successful PCI with 2.5 x 15 Kamran MISHA of the circumflex into OM1 using one MISHA, IVUS guidance  3. Mild elevation in LVEDP    Cardiac catheterization 4/7/2021  IMPRESSIONS:  1. Acute NSTEMI due to hyperacute stent thrombosis of the circumflex-OM stent  2. Successful PCI with 3.0 x 30 [place] MISHA of the circumflex/OM using an additional MISHA and IVUS guidance  3. Mechanism of stent thrombosis not clearly defined angiographically or by IVUS  4. Normal LVEDP and LVEF      Imaging  Chest X-Ray: 4/8/2021  1.  Slight hazy opacity at the left lung base suggests possible infiltrate.  2.  Cardiomegaly      Assessment/Plan  1. NSTEMI/CAD  -Chest pain resolved  -Continue ASA 81 mg daily, atorvastatin 40 mg every evening, carvedilol 3.125 mg twice daily, and prasugrel 10 mg daily    2.  HTN  -Continue carvedilol 3.125 mg twice daily and lisinopril 10 mg daily            Thank you for allowing me to participate in the care of this patient.  I will continue to follow this patient    Please contact me with any questions.        KATIE Preciado  [facility]  [phone]    No future appointments.    Please note that this dictation was created using voice recognition software. I have worked with consultants from the vendor as well as technical experts from [facility] to optimize the interface. I have made every reasonable attempt to correct obvious errors, but I expect that there are errors of grammar and possibly content I did not discover before finalizing the note.

## 2021-04-08 NOTE — CARE PLAN
Problem: Communication  Goal: The ability to communicate needs accurately and effectively will improve  Outcome: PROGRESSING AS EXPECTED     Problem: Safety  Goal: Will remain free from injury  Outcome: PROGRESSING AS EXPECTED  Goal: Will remain free from falls  Outcome: PROGRESSING AS EXPECTED     Problem: Knowledge Deficit  Goal: Knowledge of disease process/condition, treatment plan, diagnostic tests, and medications will improve  Outcome: PROGRESSING AS EXPECTED  Note: Educated the patient on the plan of care for the evening. Educated patient on post cardiac cath bleeding precautions. All questions answered at this time.  Goal: Knowledge of the prescribed therapeutic regimen will improve  Outcome: PROGRESSING AS EXPECTED

## 2021-04-08 NOTE — PROGRESS NOTES
Patient transferred back to the room after second cardiac cath. Tele box placed, monitor room notified SR 83. Patient states he does not have any pain at this time. R radial site has TR band in place. R groin site is clean, dry and intact. Patient AxO 4, O2 @  RA, VSS. Call bell and personal items within reach, bed locked in low position. Bed exit alarm on. Hourly rounding in place.

## 2021-04-08 NOTE — PROGRESS NOTES
Page cardiologist regarding heparin drip post cardiac cath. Patient receiving angiomax. Received orders from MD Winslow to discontinue the heparin drip.

## 2021-04-08 NOTE — PROGRESS NOTES
"1630 Pt back from cath, placed on tele box and monitor room notified. Pt complaining of mild throat pain and will be medicated with PRN acetaminophen. Right wrist cath site is CDI with TR band in place. Pt A&Ox4    1640 EKG notified of order and on way to bedside when pt began to complain of severe 12/10 check and throat pain radiating to chin. /108 with HR 82 Dr Logan notified and requested that rounding cardiologist Dr Bryant be paged/notified. Dr Bryant notified    New orders obtained  Pt given 3 total doses of nitro, 4mg morphine (2 2mg doses), PRN hydralazine and PRN labetalol with minimal relief from chest pain. Pt describes chest pain is like \"being eaten by a bear\" and like he \"swallowed a torch\". Pt continues to cry out in pain asking staff to \"help me or just let me die\" over and over again.  Dr Bryant at bedside and pt sent back to cath lab    Pt vomited x2 during transport down to cath lab    "

## 2021-04-08 NOTE — PROCEDURES
"CARDIAC CATHETERIZATION REPORT    Referring Provider: Rachel Bryant M.D.    PROCEDURE PHYSICIAN: Pavan Logan MD, [facility], [facility]  ASSISTANT: None    IMPRESSIONS:  1. Acute NSTEMI due to hyperacute stent thrombosis of the circumflex-OM stent  2. Successful PCI of the circumflex/OM using an additional MISHA and IVUS guidance  3. Mechanism of stent thrombosis not clearly defined angiographically or by IVUS  4. Normal LVEDP and LVEF    Recommendations:  Usual post PCI care    Pre-procedure diagnosis: chest pain following recent PCI  Post-procedure diagnosis: Same    Procedure performed  Selective coronary angiography  Left heart catheterization  Percutaneous coronary intervention - Stent Placement (Circumflex)  Intravascular Ultrasound (Circumflex)    Conscious sedation was supervised by myself and administered by trained personnel using fentanyl and versed between 1806 and 1842. The patient tolerated sedation without complication.     Procedure Description  1. Access: 6 [place] right femoral artery Micropuncture technique was utilized following local anesthesia with lidocaine.  Fluoroscopic guidance was utilized for femoral access Dynamic ultrasound was utilized to gain access    2. Diagnostic description: The catheter was passed to the central circulation with the aide of J tipped 0.35\" wire. A 6F JL4 and 6F Pigtail were used to inject the coronary circulation inject the left ventricle during invasive hemodynamic monitoring.     3. Description of Intervention: After confirming therapeutic anticoagulation intervention proceeded. A 6F JL4 guiding catheter was used. The lesion in the OM 1 was crossed with a 0.014\" Prowater wire. Predilation was accomplished with a 2.5 x 12 mm compliant balloon.  Next angiograms were taken after administration of IC nitroglycerin and IVUS was performed.  There was no clear mechanical explanation for the stent thrombosis though it was again noted that the proximal and distal edges of the " previously placed stent landed in a segment of heavy plaque burden.  I therefore elected to extend the length of the stent and subsequently deployed a 3.0 x 30 Kamran drug-eluting stent within the prior stent.  This was deployed at 15 [place] and postdilated with a 3.5 x 8 mm NC balloon at 12 [place] proximally.  Subsequent IVUS and angiograms demonstrated excellent result    4. Closing: At completion of the procedure the relevant equipment was removed from the body and hemostasis achieved by Angioseal after the perclose failed to track through the thick subcutaneous tissue    Findings   Hemodynamics:   Aorta: 111/57 mmHg  LV: 111/12 mmHg    Coronary Anatomy              Left Main: Normal              LAD: Minimal luminal irregularities              LCx: 20% stenosis at the ostium.  There is 100% occlusion within the stent in the mid circumflex/OM   RCA: recently imaged and not restudied     Left Ventriculography:   LVEF= 70%. Normal wall motion    Results of intervention to the OM 1:  Pre: 100% stenosis and ALYSE 0 flow  Post: 0% residual stenosis and ALYSE III flow. No dissection or distal embolization.    Technical Factors  1. Complications: None  2. Estimated Blood Loss: <50 cc  3. Specimens: None  4. Contrast Volume: 60 ml  5. Medications: Bivalirudin Intracoronary nitroglycerin

## 2021-04-09 ENCOUNTER — PATIENT OUTREACH (OUTPATIENT)
Dept: HEALTH INFORMATION MANAGEMENT | Facility: OTHER | Age: 61
End: 2021-04-09

## 2021-04-09 VITALS
SYSTOLIC BLOOD PRESSURE: 133 MMHG | HEIGHT: 71 IN | WEIGHT: 254.19 LBS | OXYGEN SATURATION: 91 % | BODY MASS INDEX: 35.59 KG/M2 | DIASTOLIC BLOOD PRESSURE: 77 MMHG | TEMPERATURE: 97.5 F | HEART RATE: 78 BPM | RESPIRATION RATE: 16 BRPM

## 2021-04-09 LAB
ALBUMIN SERPL BCP-MCNC: 3.7 G/DL (ref 3.2–4.9)
ALBUMIN/GLOB SERPL: 1.3 G/DL
ALP SERPL-CCNC: 70 U/L (ref 30–99)
ALT SERPL-CCNC: 43 U/L (ref 2–50)
ANION GAP SERPL CALC-SCNC: 10 MMOL/L (ref 7–16)
AST SERPL-CCNC: 80 U/L (ref 12–45)
BASOPHILS # BLD AUTO: 0.7 % (ref 0–1.8)
BASOPHILS # BLD: 0.05 K/UL (ref 0–0.12)
BILIRUB SERPL-MCNC: 0.2 MG/DL (ref 0.1–1.5)
BUN SERPL-MCNC: 11 MG/DL (ref 8–22)
CALCIUM SERPL-MCNC: 8.5 MG/DL (ref 8.5–10.5)
CHLORIDE SERPL-SCNC: 105 MMOL/L (ref 96–112)
CO2 SERPL-SCNC: 23 MMOL/L (ref 20–33)
CREAT SERPL-MCNC: 0.77 MG/DL (ref 0.5–1.4)
EOSINOPHIL # BLD AUTO: 0.3 K/UL (ref 0–0.51)
EOSINOPHIL NFR BLD: 4 % (ref 0–6.9)
ERYTHROCYTE [DISTWIDTH] IN BLOOD BY AUTOMATED COUNT: 44.3 FL (ref 35.9–50)
GLOBULIN SER CALC-MCNC: 2.9 G/DL (ref 1.9–3.5)
GLUCOSE BLD-MCNC: 142 MG/DL (ref 65–99)
GLUCOSE SERPL-MCNC: 186 MG/DL (ref 65–99)
HCT VFR BLD AUTO: 41.3 % (ref 42–52)
HGB BLD-MCNC: 13.8 G/DL (ref 14–18)
IMM GRANULOCYTES # BLD AUTO: 0.03 K/UL (ref 0–0.11)
IMM GRANULOCYTES NFR BLD AUTO: 0.4 % (ref 0–0.9)
LYMPHOCYTES # BLD AUTO: 2.5 K/UL (ref 1–4.8)
LYMPHOCYTES NFR BLD: 33.2 % (ref 22–41)
MCH RBC QN AUTO: 29.6 PG (ref 27–33)
MCHC RBC AUTO-ENTMCNC: 33.4 G/DL (ref 33.7–35.3)
MCV RBC AUTO: 88.6 FL (ref 81.4–97.8)
MONOCYTES # BLD AUTO: 0.49 K/UL (ref 0–0.85)
MONOCYTES NFR BLD AUTO: 6.5 % (ref 0–13.4)
NEUTROPHILS # BLD AUTO: 4.17 K/UL (ref 1.82–7.42)
NEUTROPHILS NFR BLD: 55.2 % (ref 44–72)
NRBC # BLD AUTO: 0 K/UL
NRBC BLD-RTO: 0 /100 WBC
PLATELET # BLD AUTO: 162 K/UL (ref 164–446)
PMV BLD AUTO: 11 FL (ref 9–12.9)
POTASSIUM SERPL-SCNC: 4.2 MMOL/L (ref 3.6–5.5)
PROT SERPL-MCNC: 6.6 G/DL (ref 6–8.2)
RBC # BLD AUTO: 4.66 M/UL (ref 4.7–6.1)
SODIUM SERPL-SCNC: 138 MMOL/L (ref 135–145)
WBC # BLD AUTO: 7.5 K/UL (ref 4.8–10.8)

## 2021-04-09 PROCEDURE — 99239 HOSP IP/OBS DSCHRG MGMT >30: CPT | Performed by: INTERNAL MEDICINE

## 2021-04-09 PROCEDURE — 94760 N-INVAS EAR/PLS OXIMETRY 1: CPT

## 2021-04-09 PROCEDURE — 85025 COMPLETE CBC W/AUTO DIFF WBC: CPT

## 2021-04-09 PROCEDURE — 82962 GLUCOSE BLOOD TEST: CPT

## 2021-04-09 PROCEDURE — 36415 COLL VENOUS BLD VENIPUNCTURE: CPT

## 2021-04-09 PROCEDURE — 700102 HCHG RX REV CODE 250 W/ 637 OVERRIDE(OP): Performed by: STUDENT IN AN ORGANIZED HEALTH CARE EDUCATION/TRAINING PROGRAM

## 2021-04-09 PROCEDURE — A9270 NON-COVERED ITEM OR SERVICE: HCPCS | Performed by: INTERNAL MEDICINE

## 2021-04-09 PROCEDURE — 94640 AIRWAY INHALATION TREATMENT: CPT

## 2021-04-09 PROCEDURE — 700102 HCHG RX REV CODE 250 W/ 637 OVERRIDE(OP): Performed by: INTERNAL MEDICINE

## 2021-04-09 PROCEDURE — 80053 COMPREHEN METABOLIC PANEL: CPT

## 2021-04-09 PROCEDURE — 97161 PT EVAL LOW COMPLEX 20 MIN: CPT

## 2021-04-09 PROCEDURE — 99232 SBSQ HOSP IP/OBS MODERATE 35: CPT | Performed by: INTERNAL MEDICINE

## 2021-04-09 PROCEDURE — A9270 NON-COVERED ITEM OR SERVICE: HCPCS | Performed by: STUDENT IN AN ORGANIZED HEALTH CARE EDUCATION/TRAINING PROGRAM

## 2021-04-09 RX ORDER — ATORVASTATIN CALCIUM 40 MG/1
40 TABLET, FILM COATED ORAL EVERY EVENING
Qty: 90 TABLET | Refills: 0 | Status: SHIPPED | OUTPATIENT
Start: 2021-04-09

## 2021-04-09 RX ORDER — ASPIRIN 81 MG/1
81 TABLET ORAL DAILY
Qty: 90 TABLET | Refills: 0 | Status: SHIPPED | OUTPATIENT
Start: 2021-04-09

## 2021-04-09 RX ORDER — PRASUGREL 10 MG/1
10 TABLET, FILM COATED ORAL DAILY
Qty: 30 TABLET | Refills: 11 | Status: SHIPPED | OUTPATIENT
Start: 2021-04-10

## 2021-04-09 RX ORDER — CARVEDILOL 3.12 MG/1
3.12 TABLET ORAL 2 TIMES DAILY WITH MEALS
Qty: 180 TABLET | Refills: 0 | Status: SHIPPED | OUTPATIENT
Start: 2021-04-09

## 2021-04-09 RX ADMIN — FLUTICASONE PROPIONATE 44 MCG: 44 AEROSOL, METERED RESPIRATORY (INHALATION) at 07:02

## 2021-04-09 RX ADMIN — DOCUSATE SODIUM 50 MG AND SENNOSIDES 8.6 MG 2 TABLET: 8.6; 5 TABLET, FILM COATED ORAL at 06:31

## 2021-04-09 RX ADMIN — ASPIRIN 81 MG: 81 TABLET, COATED ORAL at 11:07

## 2021-04-09 RX ADMIN — LISINOPRIL 10 MG: 10 TABLET ORAL at 06:31

## 2021-04-09 RX ADMIN — PRASUGREL 10 MG: 10 TABLET, FILM COATED ORAL at 06:31

## 2021-04-09 RX ADMIN — LEVOTHYROXINE SODIUM 75 MCG: 0.07 TABLET ORAL at 06:31

## 2021-04-09 RX ADMIN — ACETAMINOPHEN 650 MG: 325 TABLET, FILM COATED ORAL at 00:34

## 2021-04-09 RX ADMIN — UMECLIDINIUM BROMIDE AND VILANTEROL TRIFENATATE 1 PUFF: 62.5; 25 POWDER RESPIRATORY (INHALATION) at 07:02

## 2021-04-09 RX ADMIN — CARVEDILOL 3.12 MG: 3.12 TABLET, FILM COATED ORAL at 08:40

## 2021-04-09 RX ADMIN — OMEPRAZOLE 20 MG: 20 CAPSULE, DELAYED RELEASE ORAL at 06:31

## 2021-04-09 ASSESSMENT — COGNITIVE AND FUNCTIONAL STATUS - GENERAL
MOBILITY SCORE: 24
SUGGESTED CMS G CODE MODIFIER MOBILITY: CH

## 2021-04-09 ASSESSMENT — PATIENT HEALTH QUESTIONNAIRE - PHQ9
2. FEELING DOWN, DEPRESSED, IRRITABLE, OR HOPELESS: NOT AT ALL
1. LITTLE INTEREST OR PLEASURE IN DOING THINGS: NOT AT ALL
SUM OF ALL RESPONSES TO PHQ9 QUESTIONS 1 AND 2: 0

## 2021-04-09 ASSESSMENT — ENCOUNTER SYMPTOMS
COUGH: 0
CHILLS: 0
HEADACHES: 0
DIZZINESS: 0
NAUSEA: 0
PALPITATIONS: 0
SHORTNESS OF BREATH: 0
CHEST TIGHTNESS: 0
VOMITING: 0
WHEEZING: 0
FEVER: 0

## 2021-04-09 ASSESSMENT — GAIT ASSESSMENTS
DEVIATION: OTHER (COMMENT)
GAIT LEVEL OF ASSIST: SUPERVISED
DISTANCE (FEET): 350

## 2021-04-09 ASSESSMENT — PAIN DESCRIPTION - PAIN TYPE: TYPE: ACUTE PAIN

## 2021-04-09 NOTE — DISCHARGE PLANNING
Anticipated Discharge Disposition: home    Action:   0948 notified by BSRN to verify insurance w/ [facility] on [address], [phone]. Spoke w/ Nichole at [facility]. Provided Nichole w/ current insurance info [employer] Rx info (pt's old insurance was Medicaid at [facility]). Copay for Effient $5.70  10:00 updated pt at bedside and informed pt copay for Effient $5.70. pt agreeable. Provided pt w/ IMM    Barriers to Discharge: none    Plan: home

## 2021-04-09 NOTE — PROGRESS NOTES
Handoff report received. Assumed patient care. Patient resting in chair, AAOX 4. Pt is on RA. Safety precautions in place. Call light and personal belongings within reach. Bed is locked and in lowest position. Educated to call for assistance if needed, pt verbalized understanding.

## 2021-04-09 NOTE — PROGRESS NOTES
Cardiology Follow Up Progress Note    Date of Service  4/9/2021    Attending Physician  Shawn Morrow M.D.    Chief Complaint   Chest Pain    HPI  Jessee Martinez is a 60 y.o. male admitted 4/6/2021 with chest pain no previous cardiac history.  Patient had burning pain in the substernal region and throat.  It was associated with vaping and he has since stopped.    Patient was taken for cardiac catheterization 4/7/2021 in which a 2.5 x 15 mm Kamran MISHA was placed to the OM1.  Patient was taken back to his room and started having severe chest pain, so was taken back to the Cath Lab with in-stent restenosis.  A 3.0 x 30 mm Kamran MISHA was placed in the same region of the circumflex-OM1.    Interim Events  4/9/2021: SR 70-90s  Chest pain-free  Right wrist and right groin sites CDI, no hematoma, no ecchymosis  Ready for discharge  Checking cost of prasugrel at [employer]    4/8/2021: SR 60-90s  Chest pain-free  Right wrist and right groin sites CDI, no hematoma, no ecchymosis      Review of Systems  Review of Systems   Constitutional: Negative for chills and fever.   Respiratory: Negative for cough, chest tightness, shortness of breath and wheezing.    Cardiovascular: Negative for chest pain, palpitations and leg swelling.   Gastrointestinal: Negative for nausea and vomiting.   Neurological: Negative for dizziness and headaches.   All other systems reviewed and are negative.      Vital signs in last 24 hours  Temp:  [36.3 °C (97.3 °F)-37 °C (98.6 °F)] 36.4 °C (97.5 °F)  Pulse:  [64-98] 78  Resp:  [16-18] 16  BP: (102-133)/(52-79) 133/77  SpO2:  [91 %-95 %] 91 %    Physical Exam  Physical Exam  Vitals and nursing note reviewed.   Constitutional:       Appearance: Normal appearance.   HENT:      Head: Normocephalic and atraumatic.      Mouth/Throat:      Mouth: Mucous membranes are moist.   Eyes:      Extraocular Movements: Extraocular movements intact.   Neck:      Comments: No JVD  Cardiovascular:      Rate and Rhythm:  Normal rate and regular rhythm.      Pulses: Normal pulses.      Heart sounds: Normal heart sounds. No murmur.   Pulmonary:      Effort: Pulmonary effort is normal.      Breath sounds: Normal breath sounds.   Abdominal:      Palpations: Abdomen is soft.   Musculoskeletal:      Cervical back: Normal range of motion and neck supple.   Skin:     General: Skin is warm and dry.   Neurological:      General: No focal deficit present.      Mental Status: He is alert and oriented to person, place, and time.   Psychiatric:         Mood and Affect: Mood normal.         Behavior: Behavior normal.         Lab Review  Lab Results   Component Value Date/Time    WBC 7.5 2021 04:00 AM    RBC 4.66 (L) 2021 04:00 AM    HEMOGLOBIN 13.8 (L) 2021 04:00 AM    HEMATOCRIT 41.3 (L) 2021 04:00 AM    MCV 88.6 2021 04:00 AM    MCH 29.6 2021 04:00 AM    MCHC 33.4 (L) 2021 04:00 AM    MPV 11.0 2021 04:00 AM      Lab Results   Component Value Date/Time    SODIUM 138 2021 04:00 AM    POTASSIUM 4.2 2021 04:00 AM    CHLORIDE 105 2021 04:00 AM    CO2 23 2021 04:00 AM    GLUCOSE 186 (H) 2021 04:00 AM    BUN 11 2021 04:00 AM    CREATININE 0.77 2021 04:00 AM      Lab Results   Component Value Date/Time    ASTSGOT 80 (H) 2021 04:00 AM    ALTSGPT 43 2021 04:00 AM     Lab Results   Component Value Date/Time    CHOLSTRLTOT 117 2021 12:09 AM    LDL 56 2021 12:09 AM    HDL 33 (A) 2021 12:09 AM    TRIGLYCERIDE 139 2021 12:09 AM    TROPONINT 327 (H) 2021 05:51 AM       No results for input(s): NTPROBNP in the last 72 hours.    Cardiac Imaging and Procedures Review  EK2021  SINUS RHYTHM   FIRST DEGREE AV BLOCK   BORDERLINE IVCD WITH LAD   BORDERLINE LOW VOLTAGE IN FRONTAL LEADS   Poor R wave progression   Electronically Signed On 2021 21:56:38 PDT by Eyad Winslow MD    Echocardiogram: 2021  CONCLUSIONS  No prior  study is available for comparison.   Normal left ventricular systolic function.  Left ventricular ejection fraction is visually estimated to be 60%.  No significant valve abnormalities.     Cardiac Catheterization: 4/7/2021  IMPRESSIONS:  1. NSTEMI with obstructive one vessel CAD  2. Successful PCI with 2.5 x 15 Kamran MISHA of the circumflex into OM1 using one MISHA, IVUS guidance  3. Mild elevation in LVEDP    Cardiac catheterization 4/7/2021  IMPRESSIONS:  1. Acute NSTEMI due to hyperacute stent thrombosis of the circumflex-OM stent  2. Successful PCI with 3.0 x 30 [place] MISHA of the circumflex/OM using an additional MISHA and IVUS guidance  3. Mechanism of stent thrombosis not clearly defined angiographically or by IVUS  4. Normal LVEDP and LVEF      Imaging  Chest X-Ray: 4/8/2021  1.  Slight hazy opacity at the left lung base suggests possible infiltrate.  2.  Cardiomegaly      Assessment/Plan  1. NSTEMI/CAD  -Chest pain resolved  -Continue ASA 81 mg daily, atorvastatin 40 mg every evening, carvedilol 3.125 mg twice daily, and prasugrel 10 mg daily    2.  HTN  -Continue carvedilol 3.125 mg twice daily and lisinopril 10 mg daily        Thank you for allowing me to participate in the care of this patient.  I will continue to follow this patient    Please contact me with any questions.        KATIE Preciado  [facility]  [phone]    Future Appointments   Date Time Provider Department Center   4/23/2021  8:15 AM EDDIE Kenny. CB None         Please note that this dictation was created using voice recognition software. I have worked with consultants from the vendor as well as technical experts from [facility] to optimize the interface. I have made every reasonable attempt to correct obvious errors, but I expect that there are errors of grammar and possibly content I did not discover before finalizing the note.

## 2021-04-09 NOTE — PROGRESS NOTES
Pt. D/c'd home with self. Discharge teaching completed, no further questions or concerns. All personal belongings with pt. Pt is A&OX4. No signs of distress. All lines removed, tele box removed and placed in monitor room, and monitors notified.

## 2021-04-09 NOTE — CARE PLAN
Problem: Knowledge Deficit  Goal: Knowledge of disease process/condition, treatment plan, diagnostic tests, and medications will improve  Outcome: PROGRESSING AS EXPECTED  Plan of care discussed w/ pt. Encourage pt to voice feelings/concerns regarding treatment plan, diagnostic tests, procedures/results and medications. Answer accordingly.         Problem: Discharge Barriers/Planning  Goal: Patient's continuum of care needs will be met  Outcome: PROGRESSING AS EXPECTED   Patient discharge needs are assessed to identify potential barriers. Patient encouraged to participate in patient goals and discharge. Proper interdisciplinary teams collaborated with. Patient actively involved in care.

## 2021-04-09 NOTE — DISCHARGE SUMMARY
Discharge Summary    CHIEF COMPLAINT ON ADMISSION  Chief Complaint   Patient presents with   • Chest Pain     patient c/o chest pain radiating from neck down to center of chest x 2 weeks.        Reason for Admission  CP     Admission Date  4/6/2021    CODE STATUS  Prior    HPI & HOSPITAL COURSE    68-year-old male past medical history of type 2 diabetes, hypertension, tobacco use, hypothyroidism presented complaining of chest pain.  On arrival troponin is 81->241-->327, EKG new onset of LBBB, CTA no evidence of PE, pulmonic, pneumothorax. Pt was started on heparin, cardiology consulted. He did have cardiac cath 4/7-stent placement MISHA to OM1. Couple of hours later pt started to have severe chest pain 10/10. Cardiology paged. He was taken to cath again and noted that stent was restenosis. Meds adjusted. Repeat echo unremarkable. Pt was discharged on plasugrel/asa/statin. Follow up with cardiology. He feels great and no recurrence on chest pain on discharge.       Therefore, he is discharged in good and stable condition to home with close outpatient follow-up.    The patient met 2-midnight criteria for an inpatient stay at the time of discharge.    Discharge Date  04/09/2021    FOLLOW UP ITEMS POST DISCHARGE  None     DISCHARGE DIAGNOSES  Active Problems:    NSTEMI (non-ST elevated myocardial infarction) (HCC) POA: Yes    Type 2 diabetes mellitus without complication, with long-term current use of insulin (HCC) POA: Yes    Essential hypertension POA: Yes    Acquired hypothyroidism POA: Yes    Tobacco abuse POA: Yes  Resolved Problems:    * No resolved hospital problems. *      FOLLOW UP  Future Appointments   Date Time Provider Department Center   4/23/2021  8:15 AM EDDIE Kenny. [facility] None     [facility]  [address]  [address]  [facility] [id number] [phone]  Call  Your doctor has referred you to Cardiac Rehab, which is important to your recovery. Please call to make  an appointment.    GIL Kimball  [address] [address]  [phone]    In 1 week  Per office request please call to schedule your hospital follow up . Thank you       MEDICATIONS ON DISCHARGE     Medication List      START taking these medications      Instructions   aspirin 81 MG EC tablet   Take 1 tablet by mouth every day.  Dose: 81 mg     atorvastatin 40 MG Tabs  Commonly known as: LIPITOR   Take 1 tablet by mouth every evening.  Dose: 40 mg     carvedilol 3.125 MG Tabs  Commonly known as: COREG   Take 1 tablet by mouth 2 times a day with meals.  Dose: 3.125 mg     prasugrel 10 MG Tabs  Start taking on: April 10, 2021  Commonly known as: EFFIENT   Take 1 tablet by mouth every day.  Dose: 10 mg        CONTINUE taking these medications      Instructions   Lantus 100 UNIT/ML Soln  Generic drug: insulin glargine   Inject 50 Units under the skin every evening.  Dose: 50 Units     levothyroxine 75 MCG Tabs  Commonly known as: SYNTHROID   Take 75 mcg by mouth every morning on an empty stomach.  Dose: 75 mcg     lisinopril 10 MG Tabs  Commonly known as: PRINIVIL   Take 10 mg by mouth every day.  Dose: 10 mg     methadone 10 MG/ML Conc  Commonly known as: DOLOPHINE   Take 80 mg by mouth every day.  Dose: 80 mg     Synjardy XR 12.5-1000 MG Tb24  Generic drug: Empagliflozin-metFORMIN HCl ER   Take 2 Tablets by mouth every morning.  Dose: 2 tablet     Trelegy Ellipta 200-62.5-25 MCG/INH Aepb  Generic drug: Fluticasone-Umeclidin-Vilant   Inhale 1 Puff every day.  Dose: 1 Puff     Trulicity 1.5 MG/0.5ML Sopn  Generic drug: Dulaglutide   Inject 0.5 mL under the skin every Wednesday.  Dose: 0.5 mL            Allergies  No Known Allergies    DIET  No orders of the defined types were placed in this encounter.      ACTIVITY  As tolerated.  Weight bearing as tolerated    CONSULTATIONS  Cardiology     PROCEDURES  Angiogram     LABORATORY  Lab Results   Component Value Date    SODIUM 138  04/09/2021    POTASSIUM 4.2 04/09/2021    CHLORIDE 105 04/09/2021    CO2 23 04/09/2021    GLUCOSE 186 (H) 04/09/2021    BUN 11 04/09/2021    CREATININE 0.77 04/09/2021        Lab Results   Component Value Date    WBC 7.5 04/09/2021    HEMOGLOBIN 13.8 (L) 04/09/2021    HEMATOCRIT 41.3 (L) 04/09/2021    PLATELETCT 162 (L) 04/09/2021        Total time of the discharge process exceeds 35 minutes.

## 2021-04-09 NOTE — DISCHARGE INSTRUCTIONS
Discharge Instructions per Shawn Morrow M.D.  Follow up with cardiology and primary care   Take medications as prescribed     DIET: healthy, low salt     ACTIVITY: as tolerated     DIAGNOSIS: NSTEMI     Return to ER if chest pain, shortness of breath, fever, confusion, unusual bleeding, severe headache, focal weakness                Discharge Instructions    Discharged to home by car with self. Discharged via walking, hospital escort: Yes.  Special equipment needed: Not Applicable    Be sure to schedule a follow-up appointment with your primary care doctor or any specialists as instructed.     Discharge Plan:        I understand that a diet low in cholesterol, fat, and sodium is recommended for good health. Unless I have been given specific instructions below for another diet, I accept this instruction as my diet prescription.   Other diet: cardiac, low salt    Special Instructions:     HF Patient Discharge Instructions  · Monitor your weight daily, and maintain a weight chart, to track your weight changes.   · Activity as tolerated, unless your Doctor has ordered otherwise.   · Follow a low fat, low cholesterol, low salt diet unless instructed otherwise by your Doctor. Read the labels on the back of food products and track your intake of fat, cholesterol and salt.   · Fluid Restriction No. If a Fluid Restriction has been ordered by your Doctor, measure fluids with a measuring cup to ensure that you are not exceeding the restriction.   · No smoking.  · Oxygen No. If your Doctor has ordered that you wear Oxygen at home, it is important to wear it as ordered.  · Did you receive an explanation from staff on the importance of taking each of your medications and why it is necessary to keep taking them unless your doctor says to stop? Yes  · Were all of your questions answered about how to manage your heart failure and what to do if you have increased signs and symptoms after you go home? Yes  · Do you feel like your  heart failure care team involved you in the care treatment plan and allowed you to make decisions regarding your care while in the hospital and addressed any discharge needs you might have? Yes    See the educational handout provided at discharge for more information on monitoring your daily weight, activity and diet. This also explains more about Heart Failure, symptoms of a flare-up and some of the tests that you have undergone.     Warning Signs of a Flare-Up include:  · Swelling in the ankles or lower legs.  · Shortness of breath, while at rest, or while doing normal activities.   · Shortness of breath at night when in bed, or coughing in bed.   · Requiring more pillows to sleep at night, or needing to sit up at night to sleep.  · Feeling weak, dizzy or fatigued.     When to call your Doctor:  · Call [facility] seven days a week from 8:00 a.m. to 8:00 p.m. for medical questions [phone].  · Call your Primary Care Physician or Cardiologist if:   1. You experience any pain radiating to your jaw or neck.  2. You have any difficulty breathing.  3. You experience weight gain of 3 lbs in a day or 5 lbs in a week.   4. You feel any palpitations or irregular heartbeats.  5. You become dizzy or lose consciousness.   If you have had an angiogram or had a pacemaker or AICD placed, and experience:  1. Bleeding, drainage or swelling at the surgical / puncture site.  2. Fever greater than 100.0 F  3. Shock from internal defibrillator.  4. Cool and / or numb extremities.      · Is patient discharged on Warfarin / Coumadin?   No     Depression / Suicide Risk    As you are discharged from this [facility], it is important to learn how to keep safe from harming yourself.    Recognize the warning signs:  · Abrupt changes in personality, positive or negative- including increase in energy   · Giving away possessions  · Change in eating patterns- significant weight changes-  positive or negative  · Change  in sleeping patterns- unable to sleep or sleeping all the time   · Unwillingness or inability to communicate  · Depression  · Unusual sadness, discouragement and loneliness  · Talk of wanting to die  · Neglect of personal appearance   · Rebelliousness- reckless behavior  · Withdrawal from people/activities they love  · Confusion- inability to concentrate     If you or a loved one observes any of these behaviors or has concerns about self-harm, here's what you can do:  · Talk about it- your feelings and reasons for harming yourself  · Remove any means that you might use to hurt yourself (examples: pills, rope, extension cords, firearm)  · Get professional help from the community (Mental Health, Substance Abuse, psychological counseling)  · Do not be alone:Call your Safe Contact- someone whom you trust who will be there for you.  · Call your local CRISIS HOTLINE 392-2024 or 367-574-5328  · Call your local Children's Mobile Crisis Response Team [place] (022) 004-7151 or www.VIEO  · Call the toll free National Suicide Prevention Hotlines   · National Suicide Prevention Lifeline 885-500-BCTW (1655)  · National Hope Line Network 800-SUICIDE (029-8210)        Chest Pain, Nonspecific  It is often hard to give a specific diagnosis for the cause of chest pain. There is always a chance that your pain could be related to something serious, like a heart attack or a blood clot in the lungs. You need to follow up with your caregiver for further evaluation. More lab tests or other studies such as X-rays, electrocardiography, stress testing, or cardiac imaging may be needed to find the cause of your pain.  Most of the time, nonspecific chest pain improves within 2 to 3 days with rest and mild pain medicine. For the next few days, avoid physical exertion or activities that bring on pain. Do not smoke. Avoid drinking alcohol. Call your caregiver for routine follow-up as advised.   SEEK IMMEDIATE MEDICAL CARE  IF:  · You develop increased chest pain or pain that radiates to the arm, neck, jaw, back, or abdomen.   · You develop shortness of breath, increased coughing, or you start coughing up blood.   · You have severe back or abdominal pain, nausea, or vomiting.   · You develop severe weakness, fainting, fever, or chills.   Document Released: 12/18/2006 Document Revised: 03/11/2013 Document Reviewed: 06/06/2008  ExitCare® Patient Information ©2013 [employer].      Prasugrel oral tablets  What is this medicine?  PRASUGREL (PRA braydon grel) helps to prevent blood clots. This medicine is used to prevent heart attack, stroke, or other vascular events in people who are at high risk.  This medicine may be used for other purposes; ask your health care provider or pharmacist if you have questions.  COMMON BRAND NAME(S): Effient  What should I tell my health care provider before I take this medicine?  They need to know if you have any of these conditions:  · bleeding disorders  · kidney disease  · liver disease  · recent trauma or surgery  · stomach or intestinal ulcers  · stroke or transient ischemic attack  · an unusual or allergic reaction to prasugrel, other medicines, foods, dyes, or preservatives  · pregnant or trying to get pregnant  · breast-feeding  How should I use this medicine?  Take this medicine by mouth with a drink of water. Follow the directions on the prescription label. You may take this medicine with or without food. If it upsets your stomach, take it with food. Do not crush, cut, or chew the tablet. If you are not able to take the tablet whole, talk to your prescriber about other ways to take this medicine. Take your medicine at regular intervals. Do not take your medicine more often than directed. Do not stop taking except on your doctor's advice.  Talk to your pediatrician regarding the use of this medicine in children. Special care may be needed.  Overdosage: If you think you have taken too much of this  medicine contact a poison control center or emergency room at once.  NOTE: This medicine is only for you. Do not share this medicine with others.  What if I miss a dose?  If you miss a dose, take it as soon as you can. If it is almost time for your next dose, take only that dose. Do not take double or extra doses.  What may interact with this medicine?  Do not take this medicine with any of the following medications:  · defibrotide  This medicine may also interact with the following medications:  · certain medicines that treat or prevent blood clots like warfarin  · narcotic medicines for pain  · NSAIDS, medicines for pain and inflammation, like ibuprofen or naproxen  This list may not describe all possible interactions. Give your health care provider a list of all the medicines, herbs, non-prescription drugs, or dietary supplements you use. Also tell them if you smoke, drink alcohol, or use illegal drugs. Some items may interact with your medicine.  What should I watch for while using this medicine?  Visit your doctor or health care professional for regular check ups. Do not stop taking your medicine unless your doctor tells you to.  Notify your doctor or health care professional and seek emergency treatment if you develop breathing problems; changes in vision; chest pain; severe, sudden headache; pain, swelling, warmth in the leg; trouble speaking; sudden numbness or weakness of the face, arm, or leg. These can be signs that your condition has gotten worse.  If you are going to have surgery or dental work, tell your doctor or health care professional that you are taking this medicine.  What side effects may I notice from receiving this medicine?  Side effects that you should report to your doctor or health care professional as soon as possible:  · allergic reactions like skin rash, itching or hives, swelling of the face, lips, or tongue  · signs and symptoms of bleeding such as bloody or black, tarry stools; red  or dark-brown urine; spitting up blood or brown material that looks like coffee grounds; red spots on the skin; unusual bruising or bleeding from the eye, gums, or nose  Side effects that usually do not require medical attention (report to your doctor or health care professional if they continue or are bothersome):  · diarrhea  · headache  · nausea, vomiting  · pain in back, arms or legs  This list may not describe all possible side effects. Call your doctor for medical advice about side effects. You may report side effects to FDA at 2-290-FOW-6755.  Where should I keep my medicine?  Keep out of the reach of children.  Store at room temperature between 15 and 30 degrees C (59 and 86 degrees F). Keep this medicine in the original container. Keep container closed and do not remove the gray cylinder from the bottle. Throw away any unused medicine after the expiration date.  NOTE: This sheet is a summary. It may not cover all possible information. If you have questions about this medicine, talk to your doctor, pharmacist, or health care provider.  © 2020 Elsevier/[employer] Standard (2019-04-08 17:07:32)        Radial Site Care    This sheet gives you information about how to care for yourself after your procedure. Your health care provider may also give you more specific instructions. If you have problems or questions, contact your health care provider.  What can I expect after the procedure?  After the procedure, it is common to have:  · Bruising and tenderness at the catheter insertion area.  Follow these instructions at home:  Medicines  · Take over-the-counter and prescription medicines only as told by your health care provider.  Insertion site care  · Follow instructions from your health care provider about how to take care of your insertion site. Make sure you:  ? Wash your hands with soap and water before you change your bandage (dressing). If soap and water are not available, use hand .  ? Change your  dressing as told by your health care provider.  ? Leave stitches (sutures), skin glue, or adhesive strips in place. These skin closures may need to stay in place for 2 weeks or longer. If adhesive strip edges start to loosen and curl up, you may trim the loose edges. Do not remove adhesive strips completely unless your health care provider tells you to do that.  · Check your insertion site every day for signs of infection. Check for:  ? Redness, swelling, or pain.  ? Fluid or blood.  ? Pus or a bad smell.  ? Warmth.  · Do not take baths, swim, or use a hot tub until your health care provider approves.  · You may shower 24-48 hours after the procedure, or as directed by your health care provider.  ? Remove the dressing and gently wash the site with plain soap and water.  ? Pat the area dry with a clean towel.  ? Do not rub the site. That could cause bleeding.  · Do not apply powder or lotion to the site.  Activity    · For 24 hours after the procedure, or as directed by your health care provider:  ? Do not flex or bend the affected arm.  ? Do not push or pull heavy objects with the affected arm.  ? Do not drive yourself home from the hospital or clinic. You may drive 24 hours after the procedure unless your health care provider tells you not to.  ? Do not operate machinery or power tools.  · Do not lift anything that is heavier than 10 lb (4.5 kg), or the limit that you are told, until your health care provider says that it is safe.  · Ask your health care provider when it is okay to:  ? Return to work or school.  ? Resume usual physical activities or sports.  ? Resume sexual activity.  General instructions  · If the catheter site starts to bleed, raise your arm and put firm pressure on the site. If the bleeding does not stop, get help right away. This is a medical emergency.  · If you went home on the same day as your procedure, a responsible adult should be with you for the first 24 hours after you arrive  home.  · Keep all follow-up visits as told by your health care provider. This is important.  Contact a health care provider if:  · You have a fever.  · You have redness, swelling, or yellow drainage around your insertion site.  Get help right away if:  · You have unusual pain at the radial site.  · The catheter insertion area swells very fast.  · The insertion area is bleeding, and the bleeding does not stop when you hold steady pressure on the area.  · Your arm or hand becomes pale, cool, tingly, or numb.  These symptoms may represent a serious problem that is an emergency. Do not wait to see if the symptoms will go away. Get medical help right away. Call your local emergency services (911 in the [place] Do not drive yourself to the hospital.  Summary  · After the procedure, it is common to have bruising and tenderness at the site.  · Follow instructions from your health care provider about how to take care of your radial site wound. Check the wound every day for signs of infection.  · Do not lift anything that is heavier than 10 lb (4.5 kg), or the limit that you are told, until your health care provider says that it is safe.  This information is not intended to replace advice given to you by your health care provider. Make sure you discuss any questions you have with your health care provider.  Document Released: 01/20/2012 Document Revised: 01/23/2019 Document Reviewed: 01/23/2019  [employer] Patient Education © 2020 [employer]        Femoral Site Care  This sheet gives you information about how to care for yourself after your procedure. Your health care provider may also give you more specific instructions. If you have problems or questions, contact your health care provider.  What can I expect after the procedure?  After the procedure, it is common to have:  · Bruising that usually fades within 1-2 weeks.  · Tenderness at the site.  Follow these instructions at home:  Wound care  · Follow instructions from  your health care provider about how to take care of your insertion site. Make sure you:  ? Wash your hands with soap and water before you change your bandage (dressing). If soap and water are not available, use hand .  ? Change your dressing as told by your health care provider.  ? Leave stitches (sutures), skin glue, or adhesive strips in place. These skin closures may need to stay in place for 2 weeks or longer. If adhesive strip edges start to loosen and curl up, you may trim the loose edges. Do not remove adhesive strips completely unless your health care provider tells you to do that.  · Do not take baths, swim, or use a hot tub until your health care provider approves.  · You may shower 24-48 hours after the procedure or as told by your health care provider.  ? Gently wash the site with plain soap and water.  ? Pat the area dry with a clean towel.  ? Do not rub the site. This may cause bleeding.  · Do not apply powder or lotion to the site. Keep the site clean and dry.  · Check your femoral site every day for signs of infection. Check for:  ? Redness, swelling, or pain.  ? Fluid or blood.  ? Warmth.  ? Pus or a bad smell.  Activity  · For the first 2-3 days after your procedure, or as long as directed:  ? Avoid climbing stairs as much as possible.  ? Do not squat.  · Do not lift anything that is heavier than 10 lb (4.5 kg), or the limit that you are told, until your health care provider says that it is safe.  · Rest as directed.  ? Avoid sitting for a long time without moving. Get up to take short walks every 1-2 hours.  · Do not drive for 24 hours if you were given a medicine to help you relax (sedative).  General instructions  · Take over-the-counter and prescription medicines only as told by your health care provider.  · Keep all follow-up visits as told by your health care provider. This is important.  Contact a health care provider if you have:  · A fever or chills.  · You have redness, swelling,  or pain around your insertion site.  Get help right away if:  · The catheter insertion area swells very fast.  · You pass out.  · You suddenly start to sweat or your skin gets clammy.  · The catheter insertion area is bleeding, and the bleeding does not stop when you hold steady pressure on the area.  · The area near or just beyond the catheter insertion site becomes pale, cool, tingly, or numb.  These symptoms may represent a serious problem that is an emergency. Do not wait to see if the symptoms will go away. Get medical help right away. Call your local emergency services (911 in the [place] Do not drive yourself to the hospital.  Summary  · After the procedure, it is common to have bruising that usually fades within 1-2 weeks.  · Check your femoral site every day for signs of infection.  · Do not lift anything that is heavier than 10 lb (4.5 kg), or the limit that you are told, until your health care provider says that it is safe.  This information is not intended to replace advice given to you by your health care provider. Make sure you discuss any questions you have with your health care provider.  Document Released: 08/21/2015 Document Revised: 12/31/2018 Document Reviewed: 12/31/2018  [employer] Patient Education © 2020 [employer]        Coronary Angiogram With Stent  Coronary angiogram with stent placement is a procedure to widen or open a narrow blood vessel of the heart (coronary artery). Arteries may become blocked by cholesterol buildup (plaques) in the lining of the wall. When a coronary artery becomes partially blocked, blood flow to that area decreases. This may lead to chest pain or a heart attack (myocardial infarction).  A stent is a small piece of metal that looks like mesh or a spring. Stent placement may be done as treatment for a heart attack or right after a coronary angiogram in which a blocked artery is found.  Let your health care provider know about:  · Any allergies you have.  · All  medicines you are taking, including vitamins, herbs, eye drops, creams, and over-the-counter medicines.  · Any problems you or family members have had with anesthetic medicines.  · Any blood disorders you have.  · Any surgeries you have had.  · Any medical conditions you have.  · Whether you are pregnant or may be pregnant.  What are the risks?  Generally, this is a safe procedure. However, problems may occur, including:  · Damage to the heart or its blood vessels.  · A return of blockage.  · Bleeding, infection, or bruising at the insertion site.  · A collection of blood under the skin (hematoma) at the insertion site.  · A blood clot in another part of the body.  · Kidney injury.  · Allergic reaction to the dye or contrast that is used.  · Bleeding into the abdomen (retroperitoneal bleeding).  What happens before the procedure?  Staying hydrated  Follow instructions from your health care provider about hydration, which may include:  · Up to 2 hours before the procedure - you may continue to drink clear liquids, such as water, clear fruit juice, black coffee, and plain tea.    Eating and drinking restrictions  Follow instructions from your health care provider about eating and drinking, which may include:  · 8 hours before the procedure - stop eating heavy meals or foods such as meat, fried foods, or fatty foods.  · 6 hours before the procedure - stop eating light meals or foods, such as toast or cereal.  · 2 hours before the procedure - stop drinking clear liquids.  Ask your health care provider about:  · Changing or stopping your regular medicines. This is especially important if you are taking diabetes medicines or blood thinners.  · Taking medicines such as ibuprofen. These medicines can thin your blood. Do not take these medicines before your procedure if your health care provider instructs you not to. Generally, aspirin is recommended before a procedure of passing a small, thin tube (catheter) through a blood  vessel and into the heart (cardiac catheterization).  What happens during the procedure?    · An IV tube will be inserted into one of your veins.  · You will be given one or more of the following:  ? A medicine to help you relax (sedative).  ? A medicine to numb the area where the catheter will be inserted into an artery (local anesthetic).  · To reduce your risk of infection:  ? Your health care team will wash or sanitize their hands.  ? Your skin will be washed with soap.  ? Hair may be removed from the area where the catheter will be inserted.  · Using a guide wire, the catheter will be inserted into an artery. The location may be in your groin, in your wrist, or in the fold of your arm (near your elbow).  · A type of X-ray (fluoroscopy) will be used to help guide the catheter to the opening of the arteries in the heart.  · A dye will be injected into the catheter, and X-rays will be taken. The dye will help to show where any narrowing or blockages are located in the arteries.  · A tiny wire will be guided to the blocked spot, and a balloon will be inflated to make the artery wider.  · The stent will be expanded and will crush the plaques into the wall of the vessel. The stent will hold the area open and improve the blood flow. Most stents have a drug coating to reduce the risk of the stent narrowing over time.  · The artery may be made wider using a drill, laser, or other tools to remove plaques.  · When the blood flow is better, the catheter will be removed. The lining of the artery will grow over the stent, which stays where it was placed.  This procedure may vary among health care providers and hospitals.  What happens after the procedure?  · If the procedure is done through the leg, you will be kept in bed lying flat for about 6 hours. You will be instructed to not bend and not cross your legs.  · The insertion site will be checked frequently.  · The pulse in your foot or wrist will be checked  frequently.  · You may have additional blood tests, X-rays, and a test that records the electrical activity of your heart (electrocardiogram, or ECG).  This information is not intended to replace advice given to you by your health care provider. Make sure you discuss any questions you have with your health care provider.  Document Released: 06/23/2004 Document Revised: 03/29/2019 Document Reviewed: 07/23/2017  [employer] Patient Education © 2020 [employer]        Angiogram    An angiogram is an X-ray test. It is used to check your blood vessels. In this test, a dye is put into the blood vessel being checked. The dye is put into the blood vessel through a long, thin tube (catheter). The dye shows up on X-rays. It helps your doctor see if there is a blockage or other problem in the blood vessel.  The catheter may go through:  · Your upper leg area (groin).  · The fold of your arm, near your elbow.  · Your wrist.  What happens before the procedure?  Staying hydrated  Follow instructions from your doctor about hydration, which may include:  · Up to 2 hours before the procedure - you may continue to drink clear liquids, such as water, clear fruit juice, black coffee, and plain tea.  Eating and drinking restrictions  Follow instructions from your doctor about eating and drinking, which may include:  · 8 hours before the procedure - stop eating heavy meals or foods such as meat, fried foods, or fatty foods.  · 6 hours before the procedure - stop eating light meals or foods, such as toast or cereal.  · 6 hours before the procedure - stop drinking milk or drinks that contain milk.  · 2 hours before the procedure - stop drinking clear liquids.  General instructions  · Ask your doctor about:  ? Changing or stopping your normal medicines. This is important if you take diabetes medicines or blood thinners.  ? Taking medicines such as aspirin and ibuprofen. These medicines can thin your blood. Do not take these medicines before  your procedure if your doctor tells you not to.  · You may have blood samples taken.  · Plan to have someone take you home from the hospital or clinic.  · If you will be going home right after the procedure, plan to have someone with you for 24 hours.  What happens during the procedure?  · To lower your risk of infection:  ? Your health care team will wash their hands.  ? Your skin will be washed with soap.  ? Hair may be removed from the area.  · You will lie on your back on an X-ray table. You may be strapped to the table if it is tilted.  · An IV tube will be inserted into one of your veins.  · Small patches (electrodes) may be placed on your chest to check your heart rate during the procedure.  · You will be given one or more of the following:  ? A medicine to help you relax (sedative).  ? A medicine to numb the area where the catheter will be inserted (local anesthetic).  · The catheter will be inserted into an artery using a guide wire.  · Using a type of X-ray to see, your doctor will move the catheter into the blood vessel to check it.  · Dye will be put in through the catheter. X-rays of your blood vessels will then be taken.  · After the X-ray is done, the catheter will be taken out.  · A bandage will be placed over the site where the catheter was put in. Pressure will be applied to help stop any bleeding.  The procedure may vary among doctors and hospitals.  What happens after the procedure?  · You will be kept in bed lying flat for several hours. If the catheter was put in through your leg, you will be told not to bend or cross your legs.  · Your blood pressure, heart rate, breathing rate, and blood oxygen level will be checked until the medicines you were given have worn off.  · The insertion area and the pulse in your feet or wrist will be checked often.  · You will be told to drink plenty of fluids. This will help get the dye out of your body.  · More blood tests and X-rays may be done.  · Tests to  check the electrical activity in your heart (electrocardiogram) may be done.  · Do not drive for 24 hours if you were given a sedative.  · It is up to you to get the results of your procedure. Ask your doctor when your results will be ready.  Summary  · An angiogram is an X-ray test used to check your blood vessels.  · In this procedure, a dye is injected through a catheter into an artery. X-rays are then taken.  · After the procedure, you will need to lie flat for several hours and drink plenty of fluids.  This information is not intended to replace advice given to you by your health care provider. Make sure you discuss any questions you have with your health care provider.  Document Released: 03/16/2010 Document Revised: 11/30/2018 Document Reviewed: 01/24/2018  [employer] Patient Education © 2020 [employer]        Acute Coronary Syndrome  Acute coronary syndrome (ACS) is a serious problem in which there is suddenly not enough blood and oxygen reaching the heart. ACS can result in chest pain or a heart attack.  This condition is a medical emergency. If you have any symptoms of this condition, get help right away.  What are the causes?  This condition may be caused by:  · A buildup of fat and cholesterol inside the arteries (atherosclerosis). This is the most common cause. The buildup (plaque) can cause blood vessels in the heart (coronary arteries) to become narrow or blocked, which reduces blood flow to the heart. Plaque can also break off and lead to a clot, which can block an artery and cause a heart attack or stroke.  · Sudden tightening of the muscles around the coronary arteries (coronary spasm).  · Tearing of a coronary artery (spontaneous coronary artery dissection).  · Very low blood pressure (hypotension).  · An abnormal heartbeat (arrhythmia).  · Other medical conditions that cause a decrease of oxygen to the heart, such as anemiaorrespiratory failure.  · Using cocaine or methamphetamine.  What  increases the risk?  The following factors may make you more likely to develop this condition:  · Age. The risk for ACS increases as you get older.  · History of chest pain, heart attack, peripheral artery disease, or stroke.  · Having taken chemotherapy or immune-suppressing medicines.  · Being male.  · Family history of chest pain, heart disease, or stroke.  · Smoking.  · Not exercising enough.  · Being overweight.  · High cholesterol.  · High blood pressure (hypertension).  · Diabetes.  · Excessive alcohol use.  What are the signs or symptoms?  Common symptoms of this condition include:  · Chest pain. The pain may last a long time, or it may stop and come back (recur). It may feel like:  ? Crushing or squeezing.  ? Tightness, pressure, fullness, or heaviness.  · Arm, neck, jaw, or back pain.  · Heartburn or indigestion.  · Shortness of breath.  · Nausea.  · Sudden cold sweats.  · Light-headedness.  · Dizziness or passing out.  · Tiredness (fatigue).  Sometimes there are no symptoms.  How is this diagnosed?  This condition may be diagnosed based on:  · Your medical history and symptoms.  · Imaging tests, such as:  ? An electrocardiogram (ECG). This measures the heart's electrical activity.  ? X-rays.  ? CT scan.  ? A coronary angiogram. For this test, dye is injected into the heart arteries and then X-rays are taken.  ? Myocardial perfusion imaging. This test shows how well blood flows through your heart muscle.  · Blood tests. These may be repeated at certain time intervals.  · Exercise stress testing.  · Echocardiogram. This is a test that uses sound waves to produce detailed images of the heart.  How is this treated?  Treatment for this condition may include:  · Oxygen therapy.  · Medicines, such as:  ? Antiplatelet medicines and blood-thinning medicines, such as aspirin. These help prevent blood clots.  ? Medicine that dissolves any blood clots (fibrinolytic therapy).  ? Blood pressure  medicines.  ? Nitroglycerin. This helps widen blood vessels to improve blood flow.  ? Pain medicine.  ? Cholesterol-lowering medicine.  · Surgery, such as:  ? Coronary angioplasty with stent placement. This involves placing a small piece of metal that looks like mesh or a spring into a narrow coronary artery. This widens the artery and keeps it open.  ? Coronary artery bypass surgery. This involves taking a section of a blood vessel from a different part of your body and placing it on the blocked coronary artery to allow blood to flow around the blockage.  · Cardiac rehabilitation. This is a program that includes exercise training, education, and counseling to help you recover.  Follow these instructions at home:  Eating and drinking  · Eat a heart-healthy diet that includes whole grains, fruits and vegetables, lean proteins, and low-fat or nonfat dairy products.  · Limit how much salt (sodium) you eat as told by your health care provider. Follow instructions from your health care provider about any other eating or drinking restrictions, such as limiting foods that are high in fat and processed sugars.  · Use healthy cooking methods such as roasting, grilling, broiling, baking, poaching, steaming, or stir-frying.  · Work with a dietitian to follow a heart-healthy eating plan.  Medicines  · Take over-the-counter and prescription medicines only as told by your health care provider.  · Do not take these medicines unless your health care provider approves:  ? Vitamin supplements that contain vitamin A or vitamin E.  ? NSAIDs, such as ibuprofen, naproxen, or celecoxib.  ? Hormone replacement therapy that contains estrogen.  · If you are taking blood thinners:  ? Talk with your health care provider before you take any medicines that contain aspirin or NSAIDs. These medicines increase your risk for dangerous bleeding.  ? Take your medicine exactly as told, at the same time every day.  ? Avoid activities that could cause  injury or bruising, and follow instructions about how to prevent falls.  ? Wear a medical alert bracelet, and carry a card that lists what medicines you take.  Activity  · Follow your cardiac rehabilitation program. Do exercises as told by your physical therapist.  · Ask your health care provider what activities and exercises are safe for you. Follow his or her instructions about lifting, driving, or climbing stairs.  Lifestyle  · Do not use any products that contain nicotine or tobacco, such as cigarettes, e-cigarettes, and chewing tobacco. If you need help quitting, ask your health care provider.  · Do not drink alcohol if your health care provider tells you not to drink.  · If you drink alcohol:  ? Limit how much you have to 0-1 drink a day.  ? Be aware of how much alcohol is in your drink. In the [place] one drink equals one 12 oz bottle of beer (355 mL), one 5 oz glass of wine (148 mL), or one 1½ oz glass of hard liquor (44 mL).  · Maintain a healthy weight. If you need to lose weight, work with your health care provider to do so safely.  General instructions  · Tell all the health care providers who provide care for you about your heart condition, including your dentist. This may affect the medicines or treatment you receive.  · Manage any other health conditions you have, such as hypertension or diabetes. These conditions affect your heart.  · Pay attention to your mental health. You may be at higher risk for depression.  ? Find ways to manage stress.  ? Talk to your health care provider about depression screening and treatment.  · Keep your vaccinations up to date.  ? Get the flu shot (influenza vaccine) every year.  ? Get the pneumococcal vaccine if you are age 65 or older.  · If directed, monitor your blood pressure at home.  · Keep all follow-up visits as told by your health care provider. This is important.  Contact a health care provider if you:  · Feel overwhelmed or sad.  · Have trouble doing your daily  activities.  Get help right away if you:  · Have pain in your chest, neck, arm, jaw, stomach, or back that recurs, and:  ? It lasts for more than a few minutes.  ? It is not relieved by taking the medicineyour health care provider prescribed.  · Have unexplained:  ? Heavy sweating.  ? Heartburn or indigestion.  ? Nausea or vomiting.  ? Shortness of breath.  ? Difficulty breathing.  ? Fatigue.  ? Nervousness or anxiety.  ? Weakness.  ? Diarrhea.  ? Dark stools or blood in your stool.  · Have sudden light-headedness or dizziness.  · Have blood pressure that is higher than 180/120.  · Faint.  · Have thoughts about hurting yourself.  These symptoms may represent a serious problem that is an emergency. Do not wait to see if the symptoms will go away. Get medical help right away. Call your local emergency services (911 in the [place] Do not drive yourself to the hospital.   Summary  · Acute coronary syndrome (ACS) is when there is not enough blood and oxygen being supplied to the heart. ACS can result in chest pain or a heart attack.  · Acute coronary syndrome is a medical emergency. If you have any symptoms of this condition, get help right away.  · Treatment includes medicines and procedures to open the blocked arteries and restore blood flow.  This information is not intended to replace advice given to you by your health care provider. Make sure you discuss any questions you have with your health care provider.  Document Released: 12/18/2006 Document Revised: 12/30/2019 Document Reviewed: 12/30/2019  [employer] Patient Education © 2020 [employer]

## 2021-04-10 NOTE — THERAPY
Physical Therapy   Initial Evaluation     Patient Name: Jessee Martinez  Age:  60 y.o., Sex:  male  Medical Record #: [id number]  Today's Date: 4/9/2021     Precautions: Other (See Comments)(post cath/stenting)    Assessment  After initial evaluation and pt education, pt has no further acute PT needs.  Pt has dc'd to home at time of writing this note. See below for details/recs.      Plan    Recommend Physical Therapy for Evaluation only   DC Equipment Recommendations: None  Discharge Recommendations: Other -(outpatient cardiac rehab)        04/09/21 0923   Prior Living Situation   Prior Services None   Housing / Facility 1 Story House   Steps Into Home 0   Steps In Home 0   Equipment Owned None   Lives with - Patient's Self Care Capacity Spouse   Comments reports spouse able to assist as needed   Prior Level of Functional Mobility   Bed Mobility Independent   Transfer Status Independent   Ambulation Independent   Distance Ambulation (Feet)   (community)   Assistive Devices Used None   Stairs Independent   Comments I with IADLs and ADLs prior   History of Falls   History of Falls No   Cognition    Cognition / Consciousness WDL   Level of Consciousness Alert   Comments very pleasant and cooperative; receptive to education   Passive ROM Lower Body   Passive ROM Lower Body WDL   Active ROM Lower Body    Active ROM Lower Body  WDL   Strength Lower Body   Lower Body Strength  WDL   Sensation Lower Body   Lower Extremity Sensation   WDL   Neurological Concerns   Neurological Concerns No   Balance Assessment   Sitting Balance (Static) Good   Sitting Balance (Dynamic) Good   Standing Balance (Static) Good   Standing Balance (Dynamic) Good   Weight Shift Sitting Good   Weight Shift Standing Good   Comments no AD   Gait Analysis   Gait Level Of Assist Supervised   Assistive Device None   Distance (Feet) 350   # of Times Distance was Traveled 1   Deviation Other (Comment)  (reciprocla gait; steady)   # of Stairs Climbed 0    Weight Bearing Status no restrictions   Comments see balance/activity toleance   Bed Mobility    Supine to Sit Supervised   Sit to Supine Supervised   Scooting Supervised   Functional Mobility   Sit to Stand Supervised   Bed, Chair, Wheelchair Transfer Supervised   Mobility no AD   How much difficulty does the patient currently have...   Turning over in bed (including adjusting bedclothes, sheets and blankets)? 4   Sitting down on and standing up from a chair with arms (e.g., wheelchair, bedside commode, etc.) 4   Moving from lying on back to sitting on the side of the bed? 4   How much help from another person does the patient currently need...   Moving to and from a bed to a chair (including a wheelchair)? 4   Need to walk in a hospital room? 4   Climbing 3-5 steps with a railing? 4   6 clicks Mobility Score 24   Activity Tolerance   Sitting in Chair NT   Sitting Edge of Bed functional   Standing at least 6 mins   Comments mild SOB/exertion RPE at end of ambulation; recovery with seated rest; HR to ~94 with ambulation (from 74 at rest)   Edema / Skin Assessment   Edema / Skin  Not Assessed   Education Group   Education Provided Role of Physical Therapist   Role of Physical Therapist Patient Response Patient;Acceptance;Explanation;Verbal Demonstration   Additional Comments education re: use of talk test/RPE for self monitoring during recovery and with walking program; recs for outpateint cardiac rehab and general cardiac rehab recovery recs/activities and self monitoring; handout for walking program

## 2021-04-21 NOTE — PROGRESS NOTES
Chief Complaint   Patient presents with   • Hypertension     F/V Dx: Essential hypertension   • MI (Non ST Segment Elevation MI)     F/V Dx: NSTEMI (non-ST elevated myocardial infarction) (HCC)       Subjective:   Jessee Martinez is a very pleasant 60 y.o. 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     Patient was on 4/6/2021 with complaints of chest pain. He was taken for cardiac catheterization on 4/7/2021 which showed single vessel CAD and underwent successful PCI with MISHA to the OM1. When patient returned back from cath lab he had began having severe chest pain and was taken back to the cath lab for in-stent stenosis and underwent PCI with a longer MISHA to the same region of the LCx-OM1. Patient recovered well and was discharged on 4/9/2021 with close cardiology follow up here today.     Other past medical history significant for DM and HTN.     Today patient states that he is feeling overall very well. He did have some SOB for the first week or two after discharge but that has completely resolved. He is staying active walking his dog approximately 1 mile per day. Patient states that his BP has been well controlled at home, averaging in the 120s/70s.     Past Medical History:   Diagnosis Date   • Diabetes (HCC)    • Hypertension    • Thyroid disorder 2012     History reviewed. No pertinent surgical history.  History reviewed. No pertinent family history.  Social History     Socioeconomic History   • Marital status:      Spouse name: Not on file   • Number of children: Not on file   • Years of education: Not on file   • Highest education level: Not on file   Occupational History   • Not on file   Tobacco Use   • Smoking status: Former Smoker     Packs/day: 0.50     Start date: 8/7/2017   • Smokeless tobacco: Never Used   Substance and Sexual Activity   • Alcohol use: No   • Drug use: No   • Sexual activity: Not on file   Other Topics Concern   • Not on file   Social History  Narrative   • Not on file     Social Determinants of Health     Financial Resource Strain:    • Difficulty of Paying Living Expenses:    Food Insecurity:    • Worried About Running Out of Food in the Last Year:    • Ran Out of Food in the Last Year:    Transportation Needs:    • Lack of Transportation (Medical):    • Lack of Transportation (Non-Medical):    Physical Activity:    • Days of Exercise per Week:    • Minutes of Exercise per Session:    Stress:    • Feeling of Stress :    Social Connections:    • Frequency of Communication with Friends and Family:    • Frequency of Social Gatherings with Friends and Family:    • Attends [employer] Services:    • Active Member of Clubs or Organizations:    • Attends Club or Organization Meetings:    • Marital Status:    Intimate Partner Violence:    • Fear of Current or Ex-Partner:    • Emotionally Abused:    • Physically Abused:    • Sexually Abused:      No Known Allergies  Outpatient Encounter Medications as of 4/23/2021   Medication Sig Dispense Refill   • albuterol 108 (90 Base) MCG/ACT Aero Soln inhalation aerosol      • TECHLITE PEN NEEDLES      • prasugrel (EFFIENT) 10 MG Tab Take 1 tablet by mouth every day. 30 tablet 11   • aspirin EC 81 MG EC tablet Take 1 tablet by mouth every day. 90 tablet 0   • atorvastatin (LIPITOR) 40 MG Tab Take 1 tablet by mouth every evening. 90 tablet 0   • carvedilol (COREG) 3.125 MG Tab Take 1 tablet by mouth 2 times a day with meals. 180 tablet 0   • methadone (DOLOPHINE) 10 MG/ML Conc Take 80 mg by mouth every day.     • Dulaglutide (TRULICITY) 1.5 MG/0.5ML Solution Pen-injector Inject 0.5 mL under the skin every Wednesday.     • insulin glargine (LANTUS) 100 UNIT/ML Solution Inject 30 Units under the skin every evening.     • levothyroxine (SYNTHROID) 75 MCG Tab Take 75 mcg by mouth every morning on an empty stomach.     • Empagliflozin-metFORMIN HCl ER (SYNJARDY XR) 12.5-1000 MG TABLET SR 24 HR Take 2 Tablets by mouth every  "morning.     • Fluticasone-Umeclidin-Vilant (TRELEGY ELLIPTA) 200-62.5-25 MCG/INH AEROSOL POWDER, BREATH ACTIVATED Inhale 1 Puff every day.     • lisinopril (PRINIVIL) 10 MG Tab Take 10 mg by mouth every day.     • [DISCONTINUED] TRELEGY ELLIPTA 100-62.5-25 MCG/INH AEROSOL POWDER, BREATH ACTIVATED      • [DISCONTINUED] LANTUS SOLOSTAR 100 UNIT/ML Solution Pen-injector injection        No facility-administered encounter medications on file as of 4/23/2021.     Review of Systems   Constitutional: Negative for malaise/fatigue and weight loss.   Respiratory: Negative for shortness of breath.    Cardiovascular: Negative for chest pain, palpitations, orthopnea, claudication, leg swelling and PND.   Musculoskeletal: Positive for back pain (Chronic).   Neurological: Negative for dizziness and weakness.   All other systems reviewed and are negative.       Objective:   /72 (BP Location: Left arm, Patient Position: Sitting, BP Cuff Size: Adult)   Pulse 82   Resp 14   Ht 1.803 m (5' 11\")   Wt 114 kg (252 lb 3.2 oz)   SpO2 95%   BMI 35.17 kg/m²     Physical Exam   Constitutional: He is oriented to person, place, and time. He appears well-developed and well-nourished. No distress.   HENT:   Head: Normocephalic.   Eyes: EOM are normal.   Neck: No JVD present.   Cardiovascular: Normal rate and regular rhythm.   Pulmonary/Chest: Effort normal and breath sounds normal.   Abdominal: Soft. There is no abdominal tenderness.   Musculoskeletal:         General: No edema.   Neurological: He is alert and oriented to person, place, and time.   Skin: Skin is warm and dry.   Psychiatric: He has a normal mood and affect. His behavior is normal.     Cardiac Catheterization 4/7/2021 (#1)  Findings   Hemodynamics:   Aorta: 103/66 mmHg  LV: 103/17 mmHg     Coronary Anatomy  Left Main: Normal  LAD: Minimal luminal irregularities  LCx: 20% stenosis at the ostium.  The OM1 has a proximal 90% stenosis  RCA: Dominant, Diffuse luminal " irregularities throughout the AV groove with no more than 30% stenosis.  The PDA and posterior lateral branches are normal                Results of intervention to the circumflex:  Pre: 90% stenosis and ALYSE III flow  Post: 0% residual stenosis and ALYSE III flow. No dissection or distal embolization.    Cardiac Catheterization 4/7/2021 (#2)  IMPRESSIONS:  1. Acute NSTEMI due to hyperacute stent thrombosis of the circumflex-OM stent  2. Successful PCI of the circumflex/OM using an additional MIHSA and IVUS guidance  3. Mechanism of stent thrombosis not clearly defined angiographically or by IVUS  4. Normal LVEDP and LVEF     Echocardiogram 4/7/2021:  CONCLUSIONS  No prior study is available for comparison.   Normal left ventricular systolic function.  Left ventricular ejection fraction is visually estimated to be 60%.  No significant valve abnormalities    FINDINGS  Left Ventricle  Normal left ventricular chamber size. Mild concentric left ventricular   hypertrophy. Normal left ventricular systolic function. Left   ventricular ejection fraction is visually estimated to be 60%. Normal   regional wall motion. Normal diastolic function.     Right Ventricle  The right ventricle was normal in function.  Mildly dilated right   ventricle.     Right Atrium  The right atrium is normal in size.  Normal inferior vena cava size and   inspiratory collapse.     Left Atrium  The left atrium is normal in size.  Left atrial volume index is 25   mL/sq m.     Mitral Valve  Structurally normal mitral valve without significant stenosis or   regurgitation.     Aortic Valve  Structurally normal aortic valve without significant stenosis or   regurgitation.     Tricuspid Valve  Structurally normal tricuspid valve without significant stenosis or   regurgitation.     Pulmonic Valve  Structurally normal pulmonic valve without significant stenosis or   regurgitation.     Pericardium  Normal pericardium without effusion.     Aorta  The aortic root  is normal.  Ascending aorta diameter is 2.7 cm.    Assessment:     1. Coronary artery disease involving native coronary artery of native heart without angina pectoris     2. S/P angioplasty with stent     3. Essential hypertension     4. Type 2 diabetes mellitus without complication, with long-term current use of insulin (HCC)         Medical Decision Making:  Today's Assessment / Status / Plan:     CAD:  -S/P PCI to OM1 with minimal non-obstructive CAD of the RCA and Lcx.  -No recurrent anginal symptoms since discharge.   -TTE showed preserved LVEF with no WMA.  -Thorough discussion with patient regarding the importance and rational of DAPT.  -Continue ASA 81 mg daily, Effient 10 mg daily, lisinopril 10 mg daily and Coreg 3.125 mg BID and statin therapy.     HLD:  -Started on Atorvastatin this last admission.  -Recommend rechecking a lipid panel and LFTs in 3 months.     HTN  -Well controlled.    T2DM:  -On a very good regimen by his PCP with last LDL of 7.1.     Patient will follow up with Dr. Rachel Bryant in 3 months or earlier if needed. Encouraged patient to contact our office should any questions or concerns arise in the mean time. Patient understands and agrees with the plan of care.

## 2021-04-23 ENCOUNTER — OFFICE VISIT (OUTPATIENT)
Dept: CARDIOLOGY | Facility: MEDICAL CENTER | Age: 61
End: 2021-04-23
Payer: MEDICARE

## 2021-04-23 VITALS
WEIGHT: 252.2 LBS | HEIGHT: 71 IN | SYSTOLIC BLOOD PRESSURE: 130 MMHG | BODY MASS INDEX: 35.31 KG/M2 | OXYGEN SATURATION: 95 % | RESPIRATION RATE: 14 BRPM | HEART RATE: 82 BPM | DIASTOLIC BLOOD PRESSURE: 72 MMHG

## 2021-04-23 DIAGNOSIS — Z79.4 TYPE 2 DIABETES MELLITUS WITHOUT COMPLICATION, WITH LONG-TERM CURRENT USE OF INSULIN (HCC): ICD-10-CM

## 2021-04-23 DIAGNOSIS — E11.9 TYPE 2 DIABETES MELLITUS WITHOUT COMPLICATION, WITH LONG-TERM CURRENT USE OF INSULIN (HCC): ICD-10-CM

## 2021-04-23 DIAGNOSIS — I10 ESSENTIAL HYPERTENSION: ICD-10-CM

## 2021-04-23 DIAGNOSIS — Z95.820 S/P ANGIOPLASTY WITH STENT: ICD-10-CM

## 2021-04-23 DIAGNOSIS — I25.10 CORONARY ARTERY DISEASE INVOLVING NATIVE CORONARY ARTERY OF NATIVE HEART WITHOUT ANGINA PECTORIS: ICD-10-CM

## 2021-04-23 PROCEDURE — 99214 OFFICE O/P EST MOD 30 MIN: CPT | Performed by: NURSE PRACTITIONER

## 2021-04-23 RX ORDER — PEN NEEDLE, DIABETIC 31 GX5/16"
NEEDLE, DISPOSABLE MISCELLANEOUS
COMMUNITY
Start: 2021-02-18

## 2021-04-23 RX ORDER — ALBUTEROL SULFATE 90 UG/1
AEROSOL, METERED RESPIRATORY (INHALATION)
COMMUNITY
Start: 2021-04-14

## 2021-04-23 RX ORDER — INSULIN GLARGINE 100 [IU]/ML
INJECTION, SOLUTION SUBCUTANEOUS
COMMUNITY
Start: 2021-03-17 | End: 2021-04-23

## 2021-04-23 ASSESSMENT — ENCOUNTER SYMPTOMS
WEIGHT LOSS: 0
SHORTNESS OF BREATH: 0
ORTHOPNEA: 0
PALPITATIONS: 0
CLAUDICATION: 0
PND: 0
DIZZINESS: 0
WEAKNESS: 0
BACK PAIN: 1

## 2021-04-23 ASSESSMENT — FIBROSIS 4 INDEX: FIB4 SCORE: 4.67

## 2023-02-02 ENCOUNTER — APPOINTMENT (OUTPATIENT)
Dept: RADIOLOGY | Facility: MEDICAL CENTER | Age: 63
End: 2023-02-02
Attending: EMERGENCY MEDICINE
Payer: MEDICARE

## 2023-02-02 ENCOUNTER — HOSPITAL ENCOUNTER (EMERGENCY)
Facility: MEDICAL CENTER | Age: 63
End: 2023-02-02
Attending: EMERGENCY MEDICINE
Payer: MEDICARE

## 2023-02-02 VITALS
SYSTOLIC BLOOD PRESSURE: 132 MMHG | BODY MASS INDEX: 31.33 KG/M2 | RESPIRATION RATE: 16 BRPM | HEART RATE: 78 BPM | OXYGEN SATURATION: 94 % | WEIGHT: 224.65 LBS | DIASTOLIC BLOOD PRESSURE: 66 MMHG | TEMPERATURE: 98 F

## 2023-02-02 DIAGNOSIS — K59.00 CONSTIPATION, UNSPECIFIED CONSTIPATION TYPE: ICD-10-CM

## 2023-02-02 LAB
ALBUMIN SERPL BCP-MCNC: 4.4 G/DL (ref 3.2–4.9)
ALP SERPL-CCNC: 86 U/L (ref 30–99)
ALT SERPL-CCNC: 19 U/L (ref 2–50)
ANION GAP SERPL CALC-SCNC: 11 MMOL/L (ref 7–16)
AST SERPL-CCNC: 20 U/L (ref 12–45)
BASOPHILS # BLD AUTO: 1.4 % (ref 0–1.8)
BASOPHILS # BLD: 0.08 K/UL (ref 0–0.12)
BILIRUB CONJ SERPL-MCNC: <0.2 MG/DL (ref 0.1–0.5)
BILIRUB INDIRECT SERPL-MCNC: NORMAL MG/DL (ref 0–1)
BILIRUB SERPL-MCNC: 0.2 MG/DL (ref 0.1–1.5)
BUN SERPL-MCNC: 10 MG/DL (ref 8–22)
CALCIUM SERPL-MCNC: 9.4 MG/DL (ref 8.5–10.5)
CHLORIDE SERPL-SCNC: 104 MMOL/L (ref 96–112)
CO2 SERPL-SCNC: 25 MMOL/L (ref 20–33)
CREAT SERPL-MCNC: 0.76 MG/DL (ref 0.5–1.4)
D DIMER PPP IA.FEU-MCNC: 0.62 UG/ML (FEU) (ref 0–0.5)
EKG IMPRESSION: NORMAL
EOSINOPHIL # BLD AUTO: 0.37 K/UL (ref 0–0.51)
EOSINOPHIL NFR BLD: 6.7 % (ref 0–6.9)
ERYTHROCYTE [DISTWIDTH] IN BLOOD BY AUTOMATED COUNT: 42 FL (ref 35.9–50)
GFR SERPLBLD CREATININE-BSD FMLA CKD-EPI: 100 ML/MIN/1.73 M 2
GLUCOSE BLD STRIP.AUTO-MCNC: 280 MG/DL (ref 65–99)
GLUCOSE SERPL-MCNC: 277 MG/DL (ref 65–99)
HCT VFR BLD AUTO: 45.7 % (ref 42–52)
HGB BLD-MCNC: 15.4 G/DL (ref 14–18)
IMM GRANULOCYTES # BLD AUTO: 0.01 K/UL (ref 0–0.11)
IMM GRANULOCYTES NFR BLD AUTO: 0.2 % (ref 0–0.9)
LYMPHOCYTES # BLD AUTO: 2.08 K/UL (ref 1–4.8)
LYMPHOCYTES NFR BLD: 37.4 % (ref 22–41)
MCH RBC QN AUTO: 29.7 PG (ref 27–33)
MCHC RBC AUTO-ENTMCNC: 33.7 G/DL (ref 33.7–35.3)
MCV RBC AUTO: 88.1 FL (ref 81.4–97.8)
MONOCYTES # BLD AUTO: 0.32 K/UL (ref 0–0.85)
MONOCYTES NFR BLD AUTO: 5.8 % (ref 0–13.4)
NEUTROPHILS # BLD AUTO: 2.7 K/UL (ref 1.82–7.42)
NEUTROPHILS NFR BLD: 48.5 % (ref 44–72)
NRBC # BLD AUTO: 0 K/UL
NRBC BLD-RTO: 0 /100 WBC
PLATELET # BLD AUTO: 185 K/UL (ref 164–446)
PMV BLD AUTO: 10.6 FL (ref 9–12.9)
POTASSIUM SERPL-SCNC: 4.3 MMOL/L (ref 3.6–5.5)
PROT SERPL-MCNC: 7.3 G/DL (ref 6–8.2)
RBC # BLD AUTO: 5.19 M/UL (ref 4.7–6.1)
SODIUM SERPL-SCNC: 140 MMOL/L (ref 135–145)
TROPONIN T SERPL-MCNC: 17 NG/L (ref 6–19)
WBC # BLD AUTO: 5.6 K/UL (ref 4.8–10.8)

## 2023-02-02 PROCEDURE — 93005 ELECTROCARDIOGRAM TRACING: CPT | Performed by: EMERGENCY MEDICINE

## 2023-02-02 PROCEDURE — 93005 ELECTROCARDIOGRAM TRACING: CPT

## 2023-02-02 PROCEDURE — 85025 COMPLETE CBC W/AUTO DIFF WBC: CPT

## 2023-02-02 PROCEDURE — 700111 HCHG RX REV CODE 636 W/ 250 OVERRIDE (IP): Performed by: EMERGENCY MEDICINE

## 2023-02-02 PROCEDURE — 80048 BASIC METABOLIC PNL TOTAL CA: CPT

## 2023-02-02 PROCEDURE — 71045 X-RAY EXAM CHEST 1 VIEW: CPT

## 2023-02-02 PROCEDURE — 36415 COLL VENOUS BLD VENIPUNCTURE: CPT

## 2023-02-02 PROCEDURE — 74018 RADEX ABDOMEN 1 VIEW: CPT

## 2023-02-02 PROCEDURE — 80076 HEPATIC FUNCTION PANEL: CPT

## 2023-02-02 PROCEDURE — 99285 EMERGENCY DEPT VISIT HI MDM: CPT

## 2023-02-02 PROCEDURE — 84484 ASSAY OF TROPONIN QUANT: CPT

## 2023-02-02 PROCEDURE — 85379 FIBRIN DEGRADATION QUANT: CPT

## 2023-02-02 PROCEDURE — 82962 GLUCOSE BLOOD TEST: CPT

## 2023-02-02 PROCEDURE — 96374 THER/PROPH/DIAG INJ IV PUSH: CPT

## 2023-02-02 RX ORDER — EMPAGLIFLOZIN, METFORMIN HYDROCHLORIDE 12.5; 1 MG/1; MG/1
2 TABLET, EXTENDED RELEASE ORAL EVERY MORNING
Qty: 30 TABLET | Refills: 0 | Status: SHIPPED | OUTPATIENT
Start: 2023-02-02

## 2023-02-02 RX ORDER — DULAGLUTIDE 1.5 MG/.5ML
0.5 INJECTION, SOLUTION SUBCUTANEOUS
Qty: 2.24 ML | Refills: 0 | Status: SHIPPED | OUTPATIENT
Start: 2023-02-08

## 2023-02-02 RX ORDER — INSULIN GLARGINE 100 [IU]/ML
30 INJECTION, SOLUTION SUBCUTANEOUS EVERY EVENING
Qty: 10 ML | Refills: 0 | Status: ACTIVE | OUTPATIENT
Start: 2023-02-02

## 2023-02-02 RX ADMIN — FENTANYL CITRATE 100 MCG: 50 INJECTION, SOLUTION INTRAMUSCULAR; INTRAVENOUS at 16:17

## 2023-02-02 ASSESSMENT — FIBROSIS 4 INDEX: FIB4 SCORE: 4.82

## 2023-02-02 NOTE — ED PROVIDER NOTES
"  ER Provider Note    Scribed for Jaylen Wheat M.d. by oDmingo Frausto. 2/2/2023  3:30 PM    Primary Care Provider: NELLIE Calle    CHIEF COMPLAINT  Chief Complaint   Patient presents with    T-5000 GLF    Rib Pain     EXTERNAL RECORDS REVIEWED  Outpatient Notes show chronic pain, taking methadone.    HPI/ROS  LIMITATION TO HISTORY   Select: : None  OUTSIDE HISTORIAN(S):  None    Jessee Martinez is a 62 y.o. male who presents to the ED complaining of right lower sided rib pain onset yesterday. He states he fell a few days ago, but did not land on his ribs or chest when he fell. However, he first began experiencing the pain the day after the fall. While in the ED he reports associated symptoms of feeling dizzy. He denies any fevers, chills, nausea, or vomiting. His pain is exacerbated with deep breaths. He has a history of a heart attack and notes that his pain today feels similar to the pain he experienced at this time. He has a surgical history of a cholecystectomy and an appendectomy. Patient also reports unrelated symptoms of bilateral toes \"turning black\". He has a history of diabetes and notes that he has not been consistently taking his medications for the past two weeks. He takes Lantus and Synjardy. His sugars have been elevated recently, and his finger stick blood sugar was 280 in triage today.     PAST MEDICAL HISTORY  Past Medical History:   Diagnosis Date    Diabetes (HCC)     Hypertension     Thyroid disorder 2012       SURGICAL HISTORY   Cholecystectomy and Appendectomy.     FAMILY HISTORY  History reviewed. No pertinent family history.    SOCIAL HISTORY   reports that he has been smoking cigarettes. He started smoking about 5 years ago. He has been smoking an average of .5 packs per day. He has never used smokeless tobacco. He reports that he does not drink alcohol and does not use drugs.    CURRENT MEDICATIONS  Discharge Medication List as of 2/2/2023  5:12 PM        CONTINUE these " medications which have NOT CHANGED    Details   albuterol 108 (90 Base) MCG/ACT Aero Soln inhalation aerosol Historical Med      TECHLITE PEN NEEDLES KELSEY, Historical Med      prasugrel (EFFIENT) 10 MG Tab Take 1 tablet by mouth every day., Disp-30 tablet, R-11, Normal      aspirin EC 81 MG EC tablet Take 1 tablet by mouth every day., Disp-90 tablet, R-0, Normal      atorvastatin (LIPITOR) 40 MG Tab Take 1 tablet by mouth every evening., Disp-90 tablet, R-0, Normal      carvedilol (COREG) 3.125 MG Tab Take 1 tablet by mouth 2 times a day with meals., Disp-180 tablet, R-0, Normal      methadone (DOLOPHINE) 10 MG/ML Conc Take 80 mg by mouth every day., Historical Med      levothyroxine (SYNTHROID) 75 MCG Tab Take 75 mcg by mouth every morning on an empty stomach., Historical Med      Fluticasone-Umeclidin-Vilant (TRELEGY ELLIPTA) 200-62.5-25 MCG/INH AEROSOL POWDER, BREATH ACTIVATED Inhale 1 Puff every day., Historical Med      lisinopril (PRINIVIL) 10 MG Tab Take 10 mg by mouth every day., Historical Med             ALLERGIES  Patient has no known allergies.    PHYSICAL EXAM  BP (!) 145/75   Pulse 90   Temp 36.7 °C (98.1 °F) (Temporal)   Resp 16   Wt 102 kg (224 lb 10.4 oz)   SpO2 98%   BMI 31.33 kg/m²   Pulse ox interpretation: I interpret this pulse ox as normal.  Constitutional: Alert in mild distress. Not making eye contact.   HENT: No signs of trauma, Bilateral external ears normal, Nose normal.   Eyes: Conjunctiva normal, Non-icteric.   Neck: Normal range of motion, Supple, No stridor.   Lymphatic: No lymphadenopathy noted.   Cardiovascular: Regular rate and rhythm, no murmurs.   Thorax & Lungs: Normal breath sounds, No respiratory distress, No wheezing  Abdomen:  Obese, Bowel sounds normal, Soft, No masses, No pulsatile masses. No peritoneal signs.  Skin: Warm, Dry, No erythema, No rash.  No signs of infection or necrosis, including toes.  No diabetic ulcers.  Left great toe blister as below.  Back: No  midline bony tenderness.   Extremities: Blister to left great toe without warmth or erythema. No other swelling or bony deformity. Intact distal pulses, No edema, No cyanosis.  Musculoskeletal: Tenderness at lower right rib border. No other swelling or bony deformity. Good range of motion in all major joints. No or major deformities noted.   Neurologic: Alert , Normal motor function, Normal sensory function, No focal deficits noted.   Psychiatric: Affect unusual, somewhat withdrawn, judgment normal, Mood normal.     DIAGNOSTIC STUDIES    Labs:   Labs Reviewed   BASIC METABOLIC PANEL - Abnormal; Notable for the following components:       Result Value    Glucose 277 (*)     All other components within normal limits   D-DIMER - Abnormal; Notable for the following components:    D-Dimer 0.62 (*)     All other components within normal limits   POCT GLUCOSE DEVICE RESULTS - Abnormal; Notable for the following components:    POC Glucose, Blood 280 (*)     All other components within normal limits   CBC WITH DIFFERENTIAL    Narrative:     Biotin intake of greater than 5 mg per day may interfere with  troponin levels, causing false low values.   TROPONIN   HEPATIC FUNCTION PANEL   ESTIMATED GFR        EKG:   I have independently interpreted this EKG  Results for orders placed or performed during the hospital encounter of 23   EKG   Result Value Ref Range    Report       [facility]    Test Date:  2023  Pt Name:    BRYSON RAM               Department: ER  MRN:        [id number]                      Room:       [facility]  Gender:     Male                         Technician: [id number]  :        1960                   Requested By:ER TRIAGE PROTOCOL  Order #:    [id number]                    Reading MD: PATTIE SILVA MD    Measurements  Intervals                                Axis  Rate:       83                           P:          88  NE:         205                           QRS:        -44  QRSD:       106                          T:          62  QT:         386  QTc:        454    Interpretive Statements  Sinus rhythm  Left axis deviation  Possible anteroseptal infarct  Compared to ECG 04/07/2021 19:15:34  Left-axis deviation now present  Myocardial infarct finding now present  First degree AV block no longer present  Poor R-wave progression no longer present  Electronically Signed On 2-2-2023 15:29:53  PST by PATTIE SILVA MD           Radiology:   The attending emergency physician has independently interpreted the diagnostic imaging associated with this visit and am waiting the final reading from the radiologist.   Preliminary interpretation is a follows: KUB shows constipation.  Chest x-ray unremarkable.  Radiologist interpretation:     DX-CHEST-PORTABLE (1 VIEW)   Final Result      No acute cardiac or pulmonary abnormalities are identified.      [id number] VIEW   Final Result      1.  Moderate constipation without evidence of bowel obstruction.           COURSE & MEDICAL DECISION MAKING     ED Observation Status? Yes; I am placing the patient in to an observation status due to a diagnostic uncertainty as well as therapeutic intensity. Patient placed in observation status at 3:44 PM, 2/2/2023.     Observation plan is as follows: lab work and imaging as detailed below.     Upon Reevaluation, the patient does not have any evidence of an acute medical condition.  His constipation, likely drug-induced from his chronic narcotics.  This can be treated supportively at home.    Patient discharged from ED Observation status at 5:05 PM (Time) 2/2/23 (Date).     INITIAL ASSESSMENT, COURSE AND PLAN  Care Narrative:     3:30 PM - Patient seen and examined at bedside. Discussed plan of care, including labs and imaging. Patient agrees to the plan of care.  Ordered for DX-Chest, DX-Abdomen, Hepatic function panel, Troponin, BMP, CBC with differential, and an EKG to evaluate his symptoms.  Differential diagnosis include but are not limited to: Intraabdominal infection, Obviously not gallbladder disease, Pneumonia, PE, Constipation.     4:13 PM Ordered D-dimer for further evaluation. Patient will be medicated with fentanyl 100 mcg injection for pain.     5:07 PM -results of tests here are reassuring.  Age-adjusted D-dimer is negative.  He has significant constipation, which is the most likely cause of his right upper quadrant abdominal pain, without any evidence of infection or obstruction.  This is likely narcotic induced constipation/obstipation.  I discussed plan for discharge and follow up as outlined below. Patient will be prescribed MiraLAX. The patient is stable for discharge at this time and will return for any new or worsening symptoms. Patient verbalizes understanding and support with my plan for discharge.      ADDITIONAL PROBLEM LIST  Chronic pain syndrome, peripheral neuropathy    DISPOSITION AND DISCUSSIONS  Escalation of care considered, and ultimately not performed: IV fluids and acute inpatient care management, however at this time, the patient is most appropriate for outpatient management.    Barriers to care at this time, including but not limited to: Patient does not have established PCP.     Decision tools and prescription drugs considered including, but not limited to: Pain Medications not indicated, as narcotics are the most likely cause of the patient's abdominal discomfort .    The patient will return for new or worsening symptoms and is stable at the time of discharge.    DISPOSITION:  Patient will be discharged home in improved condition.        OUTPATIENT MEDICATIONS:  Discharge Medication List as of 2/2/2023  5:12 PM           FINAL DIANGOSIS  1. Constipation, unspecified constipation type         I, Domingo Frausto (Charisse), am scribing for, and in the presence of, Jaylen Wheat M.D..    Electronically signed by: Domingo Roberts), 2/2/2023    Jaylen MAGALLANES  ARA Wheat. personally performed the services described in this documentation, as scribed by Domingo Frausto in my presence, and it is both accurate and complete.      The note accurately reflects work and decisions made by me.  Jaylen Wheat M.D.  2/2/2023  7:51 PM

## 2023-02-02 NOTE — ED NOTES
Pt states R rib pain started after his fall, but denies ever striking his ribs or chest. Worse with deep breath, no signs of trauma., Noted to be slightly short of breath on exertion, pt states this is new. EKG ordered.

## 2023-02-02 NOTE — ED TRIAGE NOTES
"Chief Complaint   Patient presents with    T-5000 GLF    Rib Pain     Pt reports MGLF 2 days ago. Reports pain to right ribs.  Pt also reports that he has not been taking his medication for his DM and his toes are \"turning black.\"  States that he has not had his meds consistently x 2 weeks. Fsbs 280 in triage.  BP (!) 145/75   Pulse 90   Temp 36.7 °C (98.1 °F) (Temporal)   Resp 16   Wt 102 kg (224 lb 10.4 oz)   SpO2 98%   Pt informed of wait times. Educated on triage process.  Asked to return to triage RN for any new or worsening of symptoms. Thanked for patience.      "

## 2023-02-03 NOTE — ED NOTES
Patient discharged home per ERP.  Discharge teaching and education discussed with patient. POC discussed.   Patient verbalized understanding of discharge teaching and education. No other questions at this time. To follow up with PCP at [facility] re: foot care & diabetes medications.    RXs sent to pt's pharmacy.   PIV removed.     VSS. Patient alert and oriented. Patient arranged ride for self. Able to ambulate off unit safely with steady gait.

## 2023-02-03 NOTE — DISCHARGE INSTRUCTIONS
Your pain appears to be from constipation.  There are no other concerning findings on your evaluation.  Consultations: For people on chronic pain medications.  Use the MiraLAX that we have prescribed for the next 5 days.  You can also use glycerin suppositories, or over-the-counter enemas.  To prevent constipation in the future, take a Colace over-the-counter stool softener every time you take a dose of any narcotic pain medication.

## 2023-10-03 ENCOUNTER — APPOINTMENT (OUTPATIENT)
Dept: URGENT CARE | Facility: PHYSICIAN GROUP | Age: 63
End: 2023-10-03
Payer: MEDICARE

## 2024-02-05 NOTE — ED NOTES
Patient Herbert Ribera will require a rolling walker at home due to their diagnosis of Neuropathy to help complete the MRADL's. Pt given water per MD   admitted to icu for hyperglycemia/DKA in setting of insulin noncompliance  pt unable to give particular reason for not taking insulin  counseled on risks of uncontrolled diabetes    2/2/24 - glucose down to 100-200 range, gap closed, tolerating PO   transitioned off insulin drip, 2-3h overlap w lantus 18, lispro 6/6/6, +2 SS   increase standing doses and SS as needed   downgraded to floor